# Patient Record
Sex: FEMALE | Race: ASIAN | NOT HISPANIC OR LATINO
[De-identification: names, ages, dates, MRNs, and addresses within clinical notes are randomized per-mention and may not be internally consistent; named-entity substitution may affect disease eponyms.]

---

## 2017-01-23 ENCOUNTER — APPOINTMENT (OUTPATIENT)
Dept: PULMONOLOGY | Facility: CLINIC | Age: 77
End: 2017-01-23

## 2017-02-17 ENCOUNTER — APPOINTMENT (OUTPATIENT)
Dept: OTOLARYNGOLOGY | Facility: CLINIC | Age: 77
End: 2017-02-17

## 2017-02-20 ENCOUNTER — APPOINTMENT (OUTPATIENT)
Dept: PULMONOLOGY | Facility: CLINIC | Age: 77
End: 2017-02-20

## 2017-02-20 VITALS
OXYGEN SATURATION: 97 % | HEART RATE: 101 BPM | SYSTOLIC BLOOD PRESSURE: 154 MMHG | WEIGHT: 162 LBS | BODY MASS INDEX: 28.7 KG/M2 | DIASTOLIC BLOOD PRESSURE: 84 MMHG | HEIGHT: 63 IN

## 2017-03-23 ENCOUNTER — RX RENEWAL (OUTPATIENT)
Age: 77
End: 2017-03-23

## 2017-08-23 ENCOUNTER — APPOINTMENT (OUTPATIENT)
Dept: PULMONOLOGY | Facility: CLINIC | Age: 77
End: 2017-08-23
Payer: MEDICARE

## 2017-08-23 VITALS
HEIGHT: 63 IN | OXYGEN SATURATION: 96 % | TEMPERATURE: 97.3 F | RESPIRATION RATE: 16 BRPM | HEART RATE: 73 BPM | SYSTOLIC BLOOD PRESSURE: 130 MMHG | DIASTOLIC BLOOD PRESSURE: 70 MMHG | WEIGHT: 167 LBS | BODY MASS INDEX: 29.59 KG/M2

## 2017-08-23 DIAGNOSIS — R06.09 OTHER FORMS OF DYSPNEA: ICD-10-CM

## 2017-08-23 PROCEDURE — 99214 OFFICE O/P EST MOD 30 MIN: CPT

## 2017-09-04 ENCOUNTER — EMERGENCY (EMERGENCY)
Facility: HOSPITAL | Age: 77
LOS: 1 days | Discharge: HOME | End: 2017-09-04
Attending: EMERGENCY MEDICINE | Admitting: EMERGENCY MEDICINE
Payer: MEDICARE

## 2017-09-04 VITALS
DIASTOLIC BLOOD PRESSURE: 85 MMHG | HEART RATE: 85 BPM | SYSTOLIC BLOOD PRESSURE: 165 MMHG | OXYGEN SATURATION: 100 % | RESPIRATION RATE: 16 BRPM

## 2017-09-04 PROCEDURE — 99284 EMERGENCY DEPT VISIT MOD MDM: CPT | Mod: GC

## 2017-09-04 RX ORDER — VALACYCLOVIR 500 MG/1
1 TABLET, FILM COATED ORAL
Qty: 14 | Refills: 0 | OUTPATIENT
Start: 2017-09-04 | End: 2017-09-11

## 2017-09-04 NOTE — ED PROVIDER NOTE - MEDICAL DECISION MAKING DETAILS
78 yo F w/PMH HTN, HLD presenting for left sided facial droop w/o forehead sparing. Most likely Bell's Palsy, low suspicion for CVA. Steroid sent to pharmacy. Referral to f/u outpatient neurology.

## 2017-09-04 NOTE — ED PROVIDER NOTE - MUSCULOSKELETAL, MLM
Spine appears normal, range of motion is not limited, no muscle or joint tenderness. Equal strength upper and lower extremities

## 2017-09-04 NOTE — ED PROVIDER NOTE - PROGRESS NOTE DETAILS
pt immediately evaluated for possible stroke. pt with bell's palsy with +forehead sparing. pt stable for d/c. d/c with prednisone and valtrex and neuro f/u.

## 2017-09-04 NOTE — ED PROVIDER NOTE - OBJECTIVE STATEMENT
78 yo F presenting for left sided facial droop. Pt states left sided facial droop started two days ago at rest without preceding event. Denies any other complaints.

## 2017-09-04 NOTE — ED ADULT TRIAGE NOTE - CHIEF COMPLAINT QUOTE
Pt with c/o of left facial droop having difficulty closing the left eye since Saturday. [facility] is here to evaluate the pt. No code stroke called.

## 2017-10-05 ENCOUNTER — RX RENEWAL (OUTPATIENT)
Age: 77
End: 2017-10-05

## 2017-10-06 ENCOUNTER — RX RENEWAL (OUTPATIENT)
Age: 77
End: 2017-10-06

## 2017-10-23 ENCOUNTER — RX RENEWAL (OUTPATIENT)
Age: 77
End: 2017-10-23

## 2018-02-09 ENCOUNTER — RX RENEWAL (OUTPATIENT)
Age: 78
End: 2018-02-09

## 2018-02-19 ENCOUNTER — APPOINTMENT (OUTPATIENT)
Dept: PULMONOLOGY | Facility: CLINIC | Age: 78
End: 2018-02-19
Payer: MEDICARE

## 2018-02-19 VITALS
HEART RATE: 80 BPM | OXYGEN SATURATION: 96 % | WEIGHT: 164 LBS | TEMPERATURE: 98.4 F | RESPIRATION RATE: 16 BRPM | BODY MASS INDEX: 29.05 KG/M2 | SYSTOLIC BLOOD PRESSURE: 124 MMHG | DIASTOLIC BLOOD PRESSURE: 82 MMHG

## 2018-02-19 PROCEDURE — 94727 GAS DIL/WSHOT DETER LNG VOL: CPT

## 2018-02-19 PROCEDURE — 94729 DIFFUSING CAPACITY: CPT

## 2018-02-19 PROCEDURE — 99215 OFFICE O/P EST HI 40 MIN: CPT | Mod: 25

## 2018-02-19 PROCEDURE — 94060 EVALUATION OF WHEEZING: CPT

## 2018-02-19 RX ORDER — PROMETHAZINE HYDROCHLORIDE AND CODEINE PHOSPHATE 6.25; 1 MG/5ML; MG/5ML
6.25-1 SOLUTION ORAL
Qty: 120 | Refills: 0 | Status: COMPLETED | COMMUNITY
Start: 2017-12-18

## 2018-02-19 RX ORDER — AMOXICILLIN AND CLAVULANATE POTASSIUM 875; 125 MG/1; MG/1
875-125 TABLET, COATED ORAL
Qty: 20 | Refills: 0 | Status: COMPLETED | COMMUNITY
Start: 2017-12-18

## 2018-02-19 RX ORDER — PREDNISONE 50 MG/1
50 TABLET ORAL
Qty: 10 | Refills: 0 | Status: COMPLETED | COMMUNITY
Start: 2017-09-04

## 2018-02-19 RX ORDER — ALBUTEROL SULFATE 0.63 MG/3ML
0.63 SOLUTION RESPIRATORY (INHALATION)
Qty: 225 | Refills: 0 | Status: ACTIVE | COMMUNITY
Start: 2017-11-07

## 2018-02-19 RX ORDER — AZITHROMYCIN 250 MG/1
250 TABLET, FILM COATED ORAL
Qty: 6 | Refills: 0 | Status: COMPLETED | COMMUNITY
Start: 2017-12-13

## 2018-02-19 RX ORDER — VALACYCLOVIR 500 MG/1
500 TABLET, FILM COATED ORAL
Qty: 14 | Refills: 0 | Status: COMPLETED | COMMUNITY
Start: 2017-09-04

## 2018-02-19 RX ORDER — VALACYCLOVIR 1 G/1
1 TABLET, FILM COATED ORAL
Qty: 9 | Refills: 0 | Status: COMPLETED | COMMUNITY
Start: 2017-09-07

## 2018-02-19 RX ORDER — OMEPRAZOLE 20 MG/1
20 CAPSULE, DELAYED RELEASE ORAL
Qty: 30 | Refills: 0 | Status: COMPLETED | COMMUNITY
Start: 2017-12-18

## 2018-02-19 RX ORDER — GABAPENTIN 300 MG/1
300 CAPSULE ORAL
Qty: 60 | Refills: 0 | Status: COMPLETED | COMMUNITY
Start: 2017-08-28

## 2018-02-19 RX ORDER — PREDNISONE 10 MG/1
10 TABLET ORAL
Qty: 30 | Refills: 0 | Status: COMPLETED | COMMUNITY
Start: 2017-12-18

## 2018-03-14 ENCOUNTER — OUTPATIENT (OUTPATIENT)
Dept: OUTPATIENT SERVICES | Facility: HOSPITAL | Age: 78
LOS: 1 days | End: 2018-03-14
Payer: MEDICARE

## 2018-03-14 ENCOUNTER — RESULT REVIEW (OUTPATIENT)
Age: 78
End: 2018-03-14

## 2018-03-14 DIAGNOSIS — D12.2 BENIGN NEOPLASM OF ASCENDING COLON: ICD-10-CM

## 2018-03-15 LAB — SURGICAL PATHOLOGY STUDY: SIGNIFICANT CHANGE UP

## 2018-03-21 VITALS
DIASTOLIC BLOOD PRESSURE: 77 MMHG | SYSTOLIC BLOOD PRESSURE: 152 MMHG | HEIGHT: 63 IN | RESPIRATION RATE: 16 BRPM | OXYGEN SATURATION: 99 % | HEART RATE: 61 BPM | WEIGHT: 160.06 LBS | TEMPERATURE: 98 F

## 2018-03-21 RX ORDER — OMEPRAZOLE 10 MG/1
1 CAPSULE, DELAYED RELEASE ORAL
Qty: 0 | Refills: 0 | COMMUNITY

## 2018-03-21 NOTE — PATIENT PROFILE ADULT. - PMH
Colon polyp    COPD with asthma    GERD (gastroesophageal reflux disease)    Hyperlipidemia    Hypertension    Spinal stenosis

## 2018-03-22 ENCOUNTER — RESULT REVIEW (OUTPATIENT)
Age: 78
End: 2018-03-22

## 2018-03-22 ENCOUNTER — INPATIENT (INPATIENT)
Facility: HOSPITAL | Age: 78
LOS: 3 days | Discharge: HOME | DRG: 331 | End: 2018-03-26
Attending: SURGERY | Admitting: SURGERY
Payer: MEDICARE

## 2018-03-22 DIAGNOSIS — K63.89 OTHER SPECIFIED DISEASES OF INTESTINE: ICD-10-CM

## 2018-03-22 DIAGNOSIS — I10 ESSENTIAL (PRIMARY) HYPERTENSION: ICD-10-CM

## 2018-03-22 DIAGNOSIS — Z96.643 PRESENCE OF ARTIFICIAL HIP JOINT, BILATERAL: ICD-10-CM

## 2018-03-22 DIAGNOSIS — K21.9 GASTRO-ESOPHAGEAL REFLUX DISEASE WITHOUT ESOPHAGITIS: ICD-10-CM

## 2018-03-22 DIAGNOSIS — E11.65 TYPE 2 DIABETES MELLITUS WITH HYPERGLYCEMIA: ICD-10-CM

## 2018-03-22 DIAGNOSIS — M48.00 SPINAL STENOSIS, SITE UNSPECIFIED: ICD-10-CM

## 2018-03-22 DIAGNOSIS — Z98.890 OTHER SPECIFIED POSTPROCEDURAL STATES: Chronic | ICD-10-CM

## 2018-03-22 DIAGNOSIS — E78.5 HYPERLIPIDEMIA, UNSPECIFIED: ICD-10-CM

## 2018-03-22 DIAGNOSIS — Z79.51 LONG TERM (CURRENT) USE OF INHALED STEROIDS: ICD-10-CM

## 2018-03-22 DIAGNOSIS — Z91.018 ALLERGY TO OTHER FOODS: ICD-10-CM

## 2018-03-22 DIAGNOSIS — C18.7 MALIGNANT NEOPLASM OF SIGMOID COLON: ICD-10-CM

## 2018-03-22 DIAGNOSIS — J44.9 CHRONIC OBSTRUCTIVE PULMONARY DISEASE, UNSPECIFIED: ICD-10-CM

## 2018-03-22 LAB
ANION GAP SERPL CALC-SCNC: 11 MMOL/L — SIGNIFICANT CHANGE UP (ref 5–17)
BASE EXCESS BLDA CALC-SCNC: 1 MMOL/L — SIGNIFICANT CHANGE UP (ref -2–3)
BASOPHILS NFR BLD AUTO: 0.1 % — SIGNIFICANT CHANGE UP (ref 0–2)
BUN SERPL-MCNC: 14 MG/DL — SIGNIFICANT CHANGE UP (ref 7–23)
CA-I BLDA-SCNC: 1.1 MMOL/L — LOW (ref 1.12–1.3)
CALCIUM SERPL-MCNC: 8.3 MG/DL — LOW (ref 8.4–10.5)
CHLORIDE SERPL-SCNC: 104 MMOL/L — SIGNIFICANT CHANGE UP (ref 96–108)
CO2 SERPL-SCNC: 26 MMOL/L — SIGNIFICANT CHANGE UP (ref 22–31)
COHGB MFR BLDA: 0.7 % — SIGNIFICANT CHANGE UP
CREAT SERPL-MCNC: 0.62 MG/DL — SIGNIFICANT CHANGE UP (ref 0.5–1.3)
EOSINOPHIL NFR BLD AUTO: 0 % — SIGNIFICANT CHANGE UP (ref 0–6)
GAS PNL BLDA: SIGNIFICANT CHANGE UP
GLUCOSE BLDC GLUCOMTR-MCNC: 103 MG/DL — HIGH (ref 70–99)
GLUCOSE SERPL-MCNC: 250 MG/DL — HIGH (ref 70–99)
HCO3 BLDA-SCNC: 25 MMOL/L — SIGNIFICANT CHANGE UP (ref 21–28)
HCT VFR BLD CALC: 38.2 % — SIGNIFICANT CHANGE UP (ref 34.5–45)
HGB BLD-MCNC: 12.3 G/DL — SIGNIFICANT CHANGE UP (ref 11.5–15.5)
HGB BLDA-MCNC: 12.6 G/DL — SIGNIFICANT CHANGE UP (ref 11.5–15.5)
LYMPHOCYTES # BLD AUTO: 11.8 % — LOW (ref 13–44)
MAGNESIUM SERPL-MCNC: 1.8 MG/DL — SIGNIFICANT CHANGE UP (ref 1.6–2.6)
MCHC RBC-ENTMCNC: 29.9 PG — SIGNIFICANT CHANGE UP (ref 27–34)
MCHC RBC-ENTMCNC: 32.2 G/DL — SIGNIFICANT CHANGE UP (ref 32–36)
MCV RBC AUTO: 92.9 FL — SIGNIFICANT CHANGE UP (ref 80–100)
METHGB MFR BLDA: 0.3 % — SIGNIFICANT CHANGE UP
MONOCYTES NFR BLD AUTO: 1.6 % — LOW (ref 2–14)
NEUTROPHILS NFR BLD AUTO: 86.5 % — HIGH (ref 43–77)
O2 CT VFR BLDA CALC: SIGNIFICANT CHANGE UP (ref 15–23)
OXYHGB MFR BLDA: 98 % — SIGNIFICANT CHANGE UP (ref 94–100)
PCO2 BLDA: 37 MMHG — SIGNIFICANT CHANGE UP (ref 32–45)
PH BLDA: 7.44 — SIGNIFICANT CHANGE UP (ref 7.35–7.45)
PHOSPHATE SERPL-MCNC: 3.6 MG/DL — SIGNIFICANT CHANGE UP (ref 2.5–4.5)
PLATELET # BLD AUTO: 218 K/UL — SIGNIFICANT CHANGE UP (ref 150–400)
PO2 BLDA: 246 MMHG — HIGH (ref 83–108)
POTASSIUM BLDA-SCNC: 3.4 MMOL/L — LOW (ref 3.5–4.9)
POTASSIUM SERPL-MCNC: 3.7 MMOL/L — SIGNIFICANT CHANGE UP (ref 3.5–5.3)
POTASSIUM SERPL-SCNC: 3.7 MMOL/L — SIGNIFICANT CHANGE UP (ref 3.5–5.3)
RBC # BLD: 4.11 M/UL — SIGNIFICANT CHANGE UP (ref 3.8–5.2)
RBC # FLD: 13.2 % — SIGNIFICANT CHANGE UP (ref 10.3–16.9)
SAO2 % BLDA: 99 % — SIGNIFICANT CHANGE UP (ref 95–100)
SODIUM BLDA-SCNC: 141 MMOL/L — SIGNIFICANT CHANGE UP (ref 138–146)
SODIUM SERPL-SCNC: 141 MMOL/L — SIGNIFICANT CHANGE UP (ref 135–145)
WBC # BLD: 9.8 K/UL — SIGNIFICANT CHANGE UP (ref 3.8–10.5)
WBC # FLD AUTO: 9.8 K/UL — SIGNIFICANT CHANGE UP (ref 3.8–10.5)

## 2018-03-22 PROCEDURE — 88321 CONSLTJ&REPRT SLD PREP ELSWR: CPT

## 2018-03-22 RX ORDER — METOCLOPRAMIDE HCL 10 MG
10 TABLET ORAL ONCE
Qty: 0 | Refills: 0 | Status: DISCONTINUED | OUTPATIENT
Start: 2018-03-22 | End: 2018-03-26

## 2018-03-22 RX ORDER — CHOLECALCIFEROL (VITAMIN D3) 125 MCG
1 CAPSULE ORAL
Qty: 0 | Refills: 0 | COMMUNITY

## 2018-03-22 RX ORDER — TIOTROPIUM BROMIDE 18 UG/1
1 CAPSULE ORAL; RESPIRATORY (INHALATION)
Qty: 0 | Refills: 0 | COMMUNITY

## 2018-03-22 RX ORDER — BUDESONIDE AND FORMOTEROL FUMARATE DIHYDRATE 160; 4.5 UG/1; UG/1
2 AEROSOL RESPIRATORY (INHALATION)
Qty: 0 | Refills: 0 | Status: DISCONTINUED | OUTPATIENT
Start: 2018-03-22 | End: 2018-03-26

## 2018-03-22 RX ORDER — HYDROMORPHONE HYDROCHLORIDE 2 MG/ML
0.5 INJECTION INTRAMUSCULAR; INTRAVENOUS; SUBCUTANEOUS ONCE
Qty: 0 | Refills: 0 | Status: DISCONTINUED | OUTPATIENT
Start: 2018-03-22 | End: 2018-03-23

## 2018-03-22 RX ORDER — ONDANSETRON 8 MG/1
4 TABLET, FILM COATED ORAL EVERY 6 HOURS
Qty: 0 | Refills: 0 | Status: DISCONTINUED | OUTPATIENT
Start: 2018-03-22 | End: 2018-03-26

## 2018-03-22 RX ORDER — HEPARIN SODIUM 5000 [USP'U]/ML
5000 INJECTION INTRAVENOUS; SUBCUTANEOUS EVERY 8 HOURS
Qty: 0 | Refills: 0 | Status: DISCONTINUED | OUTPATIENT
Start: 2018-03-23 | End: 2018-03-26

## 2018-03-22 RX ORDER — CEFOTETAN DISODIUM 1 G
2 VIAL (EA) INJECTION EVERY 12 HOURS
Qty: 0 | Refills: 0 | Status: COMPLETED | OUTPATIENT
Start: 2018-03-22 | End: 2018-03-23

## 2018-03-22 RX ORDER — KETOROLAC TROMETHAMINE 30 MG/ML
30 SYRINGE (ML) INJECTION EVERY 6 HOURS
Qty: 0 | Refills: 0 | Status: DISCONTINUED | OUTPATIENT
Start: 2018-03-22 | End: 2018-03-25

## 2018-03-22 RX ORDER — OMEPRAZOLE 10 MG/1
1 CAPSULE, DELAYED RELEASE ORAL
Qty: 0 | Refills: 0 | COMMUNITY

## 2018-03-22 RX ORDER — BUDESONIDE AND FORMOTEROL FUMARATE DIHYDRATE 160; 4.5 UG/1; UG/1
2 AEROSOL RESPIRATORY (INHALATION)
Qty: 0 | Refills: 0 | COMMUNITY

## 2018-03-22 RX ORDER — ACETAMINOPHEN 500 MG
1000 TABLET ORAL ONCE
Qty: 0 | Refills: 0 | Status: COMPLETED | OUTPATIENT
Start: 2018-03-22 | End: 2018-03-23

## 2018-03-22 RX ORDER — SODIUM CHLORIDE 9 MG/ML
1000 INJECTION, SOLUTION INTRAVENOUS
Qty: 0 | Refills: 0 | Status: DISCONTINUED | OUTPATIENT
Start: 2018-03-22 | End: 2018-03-23

## 2018-03-22 RX ORDER — ATENOLOL 25 MG/1
25 TABLET ORAL DAILY
Qty: 0 | Refills: 0 | Status: DISCONTINUED | OUTPATIENT
Start: 2018-03-22 | End: 2018-03-26

## 2018-03-22 RX ORDER — PANTOPRAZOLE SODIUM 20 MG/1
40 TABLET, DELAYED RELEASE ORAL
Qty: 0 | Refills: 0 | Status: DISCONTINUED | OUTPATIENT
Start: 2018-03-22 | End: 2018-03-26

## 2018-03-22 RX ORDER — SIMVASTATIN 20 MG/1
20 TABLET, FILM COATED ORAL AT BEDTIME
Qty: 0 | Refills: 0 | Status: DISCONTINUED | OUTPATIENT
Start: 2018-03-22 | End: 2018-03-26

## 2018-03-22 RX ORDER — ATENOLOL 25 MG/1
1 TABLET ORAL
Qty: 0 | Refills: 0 | COMMUNITY

## 2018-03-22 RX ORDER — SIMVASTATIN 20 MG/1
1 TABLET, FILM COATED ORAL
Qty: 0 | Refills: 0 | COMMUNITY

## 2018-03-22 RX ORDER — TIOTROPIUM BROMIDE 18 UG/1
1 CAPSULE ORAL; RESPIRATORY (INHALATION) DAILY
Qty: 0 | Refills: 0 | Status: DISCONTINUED | OUTPATIENT
Start: 2018-03-22 | End: 2018-03-26

## 2018-03-22 RX ADMIN — Medication 30 MILLIGRAM(S): at 21:27

## 2018-03-22 RX ADMIN — Medication 30 MILLIGRAM(S): at 21:58

## 2018-03-22 NOTE — BRIEF OPERATIVE NOTE - OPERATION/FINDINGS
Preoperative Diagnosis: colon mass  Postoperative Diagnosis: same  Procedure: laparoscopic sigmoidectomy, BSO  Findings: colon mass not invading uterus.  BSO performed given concern for potential recurrence in the ovaries, but they were not grossly involved. Preoperative Diagnosis: colon mass  Postoperative Diagnosis: same  Procedure: laparoscopic sigmoidectomy, BSO  Findings: colon mass not invading uterus.  BSO performed given concern for potential recurrence in the ovaries, but they were not grossly involved.  ICG test showed good flow.  Negative air leak test.

## 2018-03-22 NOTE — H&P ADULT - HISTORY OF PRESENT ILLNESS
78F found to have rectal polyp that was suspicious on imaging for a malignancy, now here for sigmoidectomy.    PHYSICAL EXAM:  Constitutional: no acute distress, NC/AT  Respiratory: CTA/B  Cardiovascular: RRR, no M/R/G  Gastrointestinal: soft, NT/ND  Genitourinary: no CVA TTP    A/P: 78F s/p laparoscopic sigmoidectomy, BSO, cystoscopy and ureteral stent placement, s/p removal of both stents at end of case.    1. s/p surgery  - clears / IVF  - pain control: hPCA  - OOBA / DVT PPx / IS    2. DM  - ISS    3. HTN  - restart home atenolol    4. COPD w/ asthma  - restart home budenoside-formeterol  - restart home tiotropium    5. HLD  - restart home simvastatin    6. dispo  - likely home w/o home care services

## 2018-03-22 NOTE — PROGRESS NOTE ADULT - SUBJECTIVE AND OBJECTIVE BOX
Procedure: Laparoscopic sigmoidectomy  Surgeon:     Subjective: Patient examined postoperatively. Pain adequately controlled. Denies f/c/n/v/CP/SOB.       Vital Signs Last 24 Hrs  T(C): 36.7 (22 Mar 2018 21:34), Max: 36.7 (22 Mar 2018 20:19)  T(F): 98 (22 Mar 2018 21:34), Max: 98 (22 Mar 2018 20:19)  HR: 74 (22 Mar 2018 21:34) (66 - 74)  BP: 149/72 (22 Mar 2018 21:34) (142/76 - 162/75)  BP(mean): 107 (22 Mar 2018 21:34) (94 - 112)  RR: 19 (22 Mar 2018 21:34) (18 - 29)  SpO2: 100% (22 Mar 2018 21:34) (100% - 100%)    Physical Exam:  General: NAD, resting comfortably in bed  Pulmonary: Nonlabored breathing, no respiratory distress  Abdominal: soft, appropriately tender, mildly distended, incisions CDI      LABS:                        12.3   9.8   )-----------( 218      ( 22 Mar 2018 20:45 )             38.2     03-22    141  |  104  |  14  ----------------------------<  250<H>  3.7   |  26  |  0.62    Ca    8.3<L>      22 Mar 2018 20:45  Phos  3.6     03-22  Mg     1.8     03-22        CAPILLARY BLOOD GLUCOSE      POCT Blood Glucose.: 103 mg/dL (22 Mar 2018 13:24)        ABO Interpretation: O (03-22 @ 16:20)

## 2018-03-23 RX ORDER — INSULIN LISPRO 100/ML
VIAL (ML) SUBCUTANEOUS AT BEDTIME
Qty: 0 | Refills: 0 | Status: DISCONTINUED | OUTPATIENT
Start: 2018-03-23 | End: 2018-03-24

## 2018-03-23 RX ORDER — MAGNESIUM SULFATE 500 MG/ML
1 VIAL (ML) INJECTION ONCE
Qty: 0 | Refills: 0 | Status: COMPLETED | OUTPATIENT
Start: 2018-03-23 | End: 2018-03-23

## 2018-03-23 RX ORDER — SODIUM CHLORIDE 9 MG/ML
1000 INJECTION, SOLUTION INTRAVENOUS
Qty: 0 | Refills: 0 | Status: DISCONTINUED | OUTPATIENT
Start: 2018-03-23 | End: 2018-03-26

## 2018-03-23 RX ORDER — POTASSIUM CHLORIDE 20 MEQ
10 PACKET (EA) ORAL
Qty: 0 | Refills: 0 | Status: COMPLETED | OUTPATIENT
Start: 2018-03-23 | End: 2018-03-23

## 2018-03-23 RX ORDER — INSULIN LISPRO 100/ML
VIAL (ML) SUBCUTANEOUS
Qty: 0 | Refills: 0 | Status: DISCONTINUED | OUTPATIENT
Start: 2018-03-23 | End: 2018-03-26

## 2018-03-23 RX ADMIN — Medication 1000 MILLIGRAM(S): at 22:46

## 2018-03-23 RX ADMIN — HEPARIN SODIUM 5000 UNIT(S): 5000 INJECTION INTRAVENOUS; SUBCUTANEOUS at 14:21

## 2018-03-23 RX ADMIN — Medication 100 GRAM(S): at 14:21

## 2018-03-23 RX ADMIN — Medication 1 DROP(S): at 07:02

## 2018-03-23 RX ADMIN — Medication 1 DROP(S): at 00:02

## 2018-03-23 RX ADMIN — SIMVASTATIN 20 MILLIGRAM(S): 20 TABLET, FILM COATED ORAL at 22:45

## 2018-03-23 RX ADMIN — SODIUM CHLORIDE 110 MILLILITER(S): 9 INJECTION, SOLUTION INTRAVENOUS at 18:43

## 2018-03-23 RX ADMIN — HEPARIN SODIUM 5000 UNIT(S): 5000 INJECTION INTRAVENOUS; SUBCUTANEOUS at 22:46

## 2018-03-23 RX ADMIN — HEPARIN SODIUM 5000 UNIT(S): 5000 INJECTION INTRAVENOUS; SUBCUTANEOUS at 07:02

## 2018-03-23 RX ADMIN — Medication 30 MILLIGRAM(S): at 16:00

## 2018-03-23 RX ADMIN — Medication 100 GRAM(S): at 07:02

## 2018-03-23 RX ADMIN — PANTOPRAZOLE SODIUM 40 MILLIGRAM(S): 20 TABLET, DELAYED RELEASE ORAL at 07:02

## 2018-03-23 RX ADMIN — ATENOLOL 25 MILLIGRAM(S): 25 TABLET ORAL at 07:02

## 2018-03-23 RX ADMIN — Medication 100 MILLIEQUIVALENT(S): at 11:19

## 2018-03-23 RX ADMIN — Medication 400 MILLIGRAM(S): at 22:45

## 2018-03-23 RX ADMIN — HYDROMORPHONE HYDROCHLORIDE 0.5 MILLIGRAM(S): 2 INJECTION INTRAMUSCULAR; INTRAVENOUS; SUBCUTANEOUS at 00:32

## 2018-03-23 RX ADMIN — Medication 100 GRAM(S): at 18:54

## 2018-03-23 RX ADMIN — Medication 30 MILLIGRAM(S): at 14:56

## 2018-03-23 NOTE — PROGRESS NOTE ADULT - SUBJECTIVE AND OBJECTIVE BOX
o/n: s/p lap sigmoidectomy, POC WNL o/n: s/p lap anterior resection, POC WNL    Vital Signs Last 24 Hrs  T(C): 36.3 (23 Mar 2018 09:16), Max: 36.7 (22 Mar 2018 20:19)  T(F): 97.4 (23 Mar 2018 09:16), Max: 98 (22 Mar 2018 20:19)  HR: 80 (23 Mar 2018 09:16) (66 - 80)  BP: 117/70 (23 Mar 2018 09:16) (117/70 - 162/75)  BP(mean): 88 (23 Mar 2018 00:04) (88 - 112)  RR: 14 (23 Mar 2018 09:16) (14 - 29)  SpO2: 98% (23 Mar 2018 09:16) (96% - 100%)      I&O's Summary    22 Mar 2018 07:01  -  23 Mar 2018 07:00  --------------------------------------------------------  IN: 1650 mL / OUT: 1050 mL / NET: 600 mL    23 Mar 2018 07:01  -  23 Mar 2018 10:31  --------------------------------------------------------  IN: 450 mL / OUT: 150 mL / NET: 300 mL          Gen: NAD   Abd: soft, nt /nd   incisions c/d/i       A/P: 78F s/p laparoscopic sigmoidectomy, BSO, cystoscopy and ureteral stent placement, s/p removal of both stents at end of case.  pain control  dvt ppx   NPO   IVF   calero removed --> DTV   OOB   monitor GI fxn

## 2018-03-23 NOTE — PROGRESS NOTE ADULT - ASSESSMENT
A/P: 78F s/p laparoscopic sigmoidectomy, BSO, cystoscopy and ureteral stent placement, s/p removal of both stents at end of case.    1. s/p surgery  - clears / IVF  - pain control: hPCA  - OOBA / DVT PPx / IS    2. DM  - ISS    3. HTNA/P: 78F s/p laparoscopic sigmoidectomy, BSO, cystoscopy and ureteral stent placement, s/p removal of both stents at end of case.    - clears / IVF  - pain control: hPCA  - OOBA / DVT PPx / IS  - ISS  - restart home atenolol  - restart home budenoside-formeterol  - restart home tiotropium  - restart home simvastatin  - likely home w/o home care services

## 2018-03-23 NOTE — PROGRESS NOTE ADULT - SUBJECTIVE AND OBJECTIVE BOX
78F found to have rectal polyp that was suspicious on imaging for a malignancy, now here for sigmoidectomy.    Colon polyp    COPD with asthma    GERD (gastroesophageal reflux disease)    Hyperlipidemia    Hypertension    Spinal stenosis.    Past Surgical History:  History of bilateral hip replacements    History of laminectomy  2013.    	  MEDICATIONS:  ATENolol  Tablet 25 milliGRAM(s) Oral daily    cefoTEtan  IVPB 2 Gram(s) IV Intermittent every 12 hours    buDESOnide 160 MICROgram(s)/formoterol 4.5 MICROgram(s) Inhaler 2 Puff(s) Inhalation two times a day  tiotropium 18 MICROgram(s) Capsule 1 Capsule(s) Inhalation daily    acetaminophen  IVPB. 1000 milliGRAM(s) IV Intermittent once  ketorolac   Injectable 30 milliGRAM(s) IV Push every 6 hours PRN  metoclopramide Injectable 10 milliGRAM(s) IV Push once PRN  ondansetron Injectable 4 milliGRAM(s) IV Push every 6 hours PRN    pantoprazole    Tablet 40 milliGRAM(s) Oral before breakfast    simvastatin 20 milliGRAM(s) Oral at bedtime    artificial  tears Solution 1 Drop(s) Both EYES three times a day PRN  heparin  Injectable 5000 Unit(s) SubCutaneous every 8 hours  lactated ringers. 1000 milliLiter(s) IV Continuous <Continuous>      Complaint:     Otherwise 12 point review of systems is normal.    PHYSICAL EXAM:    Constitutional: NAD  Eyes: PERRL, EOMI, sclera non-icteric  Neck: supple, no masses, no JVD  Respiratory: CTA b/l, good air entry b/l, no wheezing, rhonchi, rales  Cardiovascular: RRR, normal S1S2, no M/R/G  Gastrointestinal: soft, NTND,   Extremities:  no c/c/e  Neurological: AAOx3    ICU Vital Signs Last 24 Hrs  T(C): 36.3 (23 Mar 2018 16:43), Max: 36.7 (22 Mar 2018 20:19)  T(F): 97.4 (23 Mar 2018 16:43), Max: 98 (22 Mar 2018 20:19)  HR: 77 (23 Mar 2018 16:43) (66 - 80)  BP: 135/70 (23 Mar 2018 16:43) (117/70 - 162/75)  BP(mean): 88 (23 Mar 2018 00:04) (88 - 112)  ABP: 144/56 (23 Mar 2018 00:04) (104/84 - 178/66)  ABP(mean): 90 (23 Mar 2018 00:04) (90 - 112)  RR: 17 (23 Mar 2018 16:43) (14 - 29)  SpO2: 97% (23 Mar 2018 16:43) (94% - 100%)        ECG:      CHEST X RAY    CT    MRI    MRA    CT ANGIO    CAROTID DUPLEX    DUPLEX     Echocardiogram    Catheterization:    Stress Test:     LABS:	 	  CARDIAC MARKERS:                              12.3   9.8   )-----------( 218      ( 22 Mar 2018 20:45 )             38.2     03-22    141  |  104  |  14  ----------------------------<  250<H>  3.7   |  26  |  0.62    Ca    8.3<L>      22 Mar 2018 20:45  Phos  3.6     03-22  Mg     1.8     03-22          ASSESSMENT/PLAN: 	    S/P sigmoidectomy     called

## 2018-03-23 NOTE — CONSULT NOTE ADULT - SUBJECTIVE AND OBJECTIVE BOX
HPI:  78F found to have rectal polyp that was suspicious on imaging for a malignancy, now here for sigmoidectomy.    PHYSICAL EXAM:  Constitutional: no acute distress, NC/AT  Respiratory: CTA/B  Cardiovascular: RRR, no M/R/G  Gastrointestinal: soft, NT/ND  Genitourinary: no CVA TTP    A/P: 78F s/p laparoscopic sigmoidectomy, BSO, cystoscopy and ureteral stent placement, s/p removal of both stents at end of case.    1. s/p surgery  - clears / IVF  - pain control: hPCA  - OOBA / DVT PPx / IS    2. DM  - ISS    3. HTN  - restart home atenolol    4. COPD w/ asthma  - restart home budenoside-formeterol  - restart home tiotropium    5. HLD  - restart home simvastatin    6. dispo  - likely home w/o home care services (22 Mar 2018 20:04)      PAST MEDICAL & SURGICAL HISTORY:  GERD (gastroesophageal reflux disease)  COPD with asthma  Colon polyp  Spinal stenosis  Hyperlipidemia  Hypertension  History of bilateral hip replacements  History of laminectomy: 2013      FAMILY HISTORY:      SOCIAL HISTORY:  Smoking:  ETOH:  Drug use:    HOME MEDICATIONS:    Insulin Pump Settings      MEDICATIONS  (STANDING):  ATENolol  Tablet 25 milliGRAM(s) Oral daily  buDESOnide 160 MICROgram(s)/formoterol 4.5 MICROgram(s) Inhaler 2 Puff(s) Inhalation two times a day  heparin  Injectable 5000 Unit(s) SubCutaneous every 8 hours  lactated ringers. 1000 milliLiter(s) (110 mL/Hr) IV Continuous <Continuous>  pantoprazole    Tablet 40 milliGRAM(s) Oral before breakfast  simvastatin 20 milliGRAM(s) Oral at bedtime  tiotropium 18 MICROgram(s) Capsule 1 Capsule(s) Inhalation daily    MEDICATIONS  (PRN):  artificial  tears Solution 1 Drop(s) Both EYES three times a day PRN Dry Eyes  ketorolac   Injectable 30 milliGRAM(s) IV Push every 6 hours PRN Moderate Pain  metoclopramide Injectable 10 milliGRAM(s) IV Push once PRN Nausea and/or Vomiting  ondansetron Injectable 4 milliGRAM(s) IV Push every 6 hours PRN Nausea      Allergies    Carrots (Pruritus)  dust (Pruritus)  No Known Drug Allergies  Nuts (Pruritus)  shellfish (Pruritus)    Intolerances        REVIEW OF SYSTEMS  CONSTITUTIONAL:  Negative fever or chills  EYES:  Negative visual field deficit, blurry vision or double vision  ENT:  Negative for sore throat, runny nose, or earache  CARDIOVASCULAR:  Negative for chest pain or palpitations  RESPIRATORY:  Negative for cough, wheezing, sputum production, or SOB   GASTROINTESTINAL:  Negative for nausea, vomiting, diarrhea, or abdominal pain  GENITOURINARY:  Negative frequency, urgency or dysuria  MUSCULOSKELETAL:  No joint pain or stiffness  INTEGUMENTARY: no rashes  NEUROLOGIC:  No headache, confusion, syncope or seizures  PSYCHIATRIC: No depression or anxiety  HEMATOLOGY AND ONCOLOGY:  No unusual infections or bleeding    CAPILLARY GLUCOSE:  CAPILLARY BLOOD GLUCOSE        LABS:                        12.3   9.8   )-----------( 218      ( 22 Mar 2018 20:45 )             38.2     03-22    141  |  104  |  14  ----------------------------<  250<H>  3.7   |  26  |  0.62    Ca    8.3<L>      22 Mar 2018 20:45  Phos  3.6     03-22  Mg     1.8     03-22            RADIOLOGY & ADDITIONAL STUDIES:      Physical Examination:  Vital Signs Last 24 Hrs  T(C): 37.1 (23 Mar 2018 22:52), Max: 37.1 (23 Mar 2018 22:52)  T(F): 98.7 (23 Mar 2018 22:52), Max: 98.7 (23 Mar 2018 22:52)  HR: 81 (23 Mar 2018 22:52) (71 - 81)  BP: 134/70 (23 Mar 2018 22:52) (117/70 - 155/73)  BP(mean): 88 (23 Mar 2018 00:04) (88 - 88)  RR: 14 (23 Mar 2018 22:52) (14 - 17)  SpO2: 95% (23 Mar 2018 22:52) (94% - 99%)    Constitutional: wn/wd in NAD.   HEENT: NCAT, MMM, OP clear, EOMI, , no proptosis or lid retraction  Neck: no thyromegaly or palpable thyroid nodules   Respiratory: lungs CTAB.  Cardiovascular: regular rhythm, normal S1 and S2, no audible murmurs, no peripheral edema  GI: soft, NT/ND, no masses/HSM appreciated.  Neurology: no tremors.   Skin: no visible rashes/lesions  Psychiatric: AAO x 3, normal affect/mood.      ASSESSMENT/RECOMMENDATIONS:  Patient is a 77y old  Female who presents with a chief complaint of sigmoid colon resection (21 Mar 2018 11:25)  Consulted for management of hyperglycemia.    Recommend:  Low carbohydrate diet  Fingersticks glucose (FSG) qid AC and HS  Lispro rapid acting insulin moderate dose sliding scale coverage  Blood sugar target 100-150 mg/dl      Plan discussed with primary team.  Management  of other medical issues per primary team.    Attending attestation:  I have seen and evaluated the patient at the bedside.   Nurse Practitioner/Fellow/Resident’s note reviewed, including vital signs, PE and laboratory results.  Direct personal management and extensive interpretation of the data conducted.   Assessment and recommendations as above. HPI:  78F found to have rectal polyp that was suspicious on imaging for a malignancy, now here for sigmoidectomy.    PHYSICAL EXAM:  Constitutional: no acute distress, NC/AT  Respiratory: CTA/B  Cardiovascular: RRR, no M/R/G  Gastrointestinal: soft, NT/ND  Genitourinary: no CVA TTP    A/P: 78F s/p laparoscopic sigmoidectomy, BSO, cystoscopy and ureteral stent placement, s/p removal of both stents at end of case.    1. s/p surgery  - clears / IVF  - pain control: hPCA  - OOBA / DVT PPx / IS    2. DM  - ISS    3. HTN  - restart home atenolol    4. COPD w/ asthma  - restart home budenoside-formeterol  - restart home tiotropium    5. HLD  - restart home simvastatin    6. dispo  - likely home w/o home care services (22 Mar 2018 20:04)      PAST MEDICAL & SURGICAL HISTORY:  GERD (gastroesophageal reflux disease)  COPD with asthma  Colon polyp  Spinal stenosis  Hyperlipidemia  Hypertension  History of bilateral hip replacements  History of laminectomy: 2013      FAMILY HISTORY:      SOCIAL HISTORY:  Smoking:  ETOH:  Drug use:    HOME MEDICATIONS:      MEDICATIONS  (STANDING):  ATENolol  Tablet 25 milliGRAM(s) Oral daily  buDESOnide 160 MICROgram(s)/formoterol 4.5 MICROgram(s) Inhaler 2 Puff(s) Inhalation two times a day  heparin  Injectable 5000 Unit(s) SubCutaneous every 8 hours  lactated ringers. 1000 milliLiter(s) (110 mL/Hr) IV Continuous <Continuous>  pantoprazole    Tablet 40 milliGRAM(s) Oral before breakfast  simvastatin 20 milliGRAM(s) Oral at bedtime  tiotropium 18 MICROgram(s) Capsule 1 Capsule(s) Inhalation daily    MEDICATIONS  (PRN):  artificial  tears Solution 1 Drop(s) Both EYES three times a day PRN Dry Eyes  ketorolac   Injectable 30 milliGRAM(s) IV Push every 6 hours PRN Moderate Pain  metoclopramide Injectable 10 milliGRAM(s) IV Push once PRN Nausea and/or Vomiting  ondansetron Injectable 4 milliGRAM(s) IV Push every 6 hours PRN Nausea      Allergies    Carrots (Pruritus)  dust (Pruritus)  No Known Drug Allergies  Nuts (Pruritus)  shellfish (Pruritus)    Intolerances        REVIEW OF SYSTEMS  CONSTITUTIONAL:  Negative fever or chills  EYES:  Negative visual field deficit, blurry vision or double vision  ENT:  Negative for sore throat, runny nose, or earache  CARDIOVASCULAR:  Negative for chest pain or palpitations  RESPIRATORY:  Negative for cough, wheezing, sputum production, or SOB   GASTROINTESTINAL:  Negative for nausea, vomiting, diarrhea, or abdominal pain  GENITOURINARY:  Negative frequency, urgency or dysuria  MUSCULOSKELETAL:  No joint pain or stiffness  INTEGUMENTARY: no rashes  NEUROLOGIC:  No headache, confusion, syncope or seizures  PSYCHIATRIC: No depression or anxiety  HEMATOLOGY AND ONCOLOGY:  No unusual infections or bleeding    CAPILLARY GLUCOSE:  CAPILLARY BLOOD GLUCOSE        LABS:                        12.3   9.8   )-----------( 218      ( 22 Mar 2018 20:45 )             38.2     03-22    141  |  104  |  14  ----------------------------<  250<H>  3.7   |  26  |  0.62    Ca    8.3<L>      22 Mar 2018 20:45  Phos  3.6     03-22  Mg     1.8     03-22            RADIOLOGY & ADDITIONAL STUDIES:      Physical Examination:  Vital Signs Last 24 Hrs  T(C): 37.1 (23 Mar 2018 22:52), Max: 37.1 (23 Mar 2018 22:52)  T(F): 98.7 (23 Mar 2018 22:52), Max: 98.7 (23 Mar 2018 22:52)  HR: 81 (23 Mar 2018 22:52) (71 - 81)  BP: 134/70 (23 Mar 2018 22:52) (117/70 - 155/73)  BP(mean): 88 (23 Mar 2018 00:04) (88 - 88)  RR: 14 (23 Mar 2018 22:52) (14 - 17)  SpO2: 95% (23 Mar 2018 22:52) (94% - 99%)    Constitutional: wn/wd in NAD.   HEENT: NCAT, MMM, OP clear, EOMI, , no proptosis or lid retraction  Neck: no thyromegaly or palpable thyroid nodules   Respiratory: lungs CTAB.  Cardiovascular: regular rhythm, normal S1 and S2, no audible murmurs, no peripheral edema  GI: soft, NT/ND, no masses/HSM appreciated.  Neurology: no tremors.   Skin: no visible rashes/lesions  Psychiatric: AAO x 3, normal affect/mood.      ASSESSMENT/RECOMMENDATIONS:  Patient is a 77y old  Female who presents with a chief complaint of sigmoid colon resection (21 Mar 2018 11:25)  Consulted for management of hyperglycemia.    Recommend:  Low carbohydrate diet  Fingersticks glucose (FSG) qid AC and HS  Lispro rapid acting insulin moderate dose sliding scale coverage  Blood sugar target 100-150 mg/dl      Plan discussed with primary team.  Management  of other medical issues per primary team.    Attending attestation:  I have seen and evaluated the patient at the bedside.   Nurse Practitioner/Fellow/Resident’s note reviewed, including vital signs, PE and laboratory results.  Direct personal management and extensive interpretation of the data conducted.   Assessment and recommendations as above.

## 2018-03-24 LAB
ANION GAP SERPL CALC-SCNC: 9 MMOL/L — SIGNIFICANT CHANGE UP (ref 5–17)
BUN SERPL-MCNC: 13 MG/DL — SIGNIFICANT CHANGE UP (ref 7–23)
CALCIUM SERPL-MCNC: 8.1 MG/DL — LOW (ref 8.4–10.5)
CHLORIDE SERPL-SCNC: 105 MMOL/L — SIGNIFICANT CHANGE UP (ref 96–108)
CO2 SERPL-SCNC: 27 MMOL/L — SIGNIFICANT CHANGE UP (ref 22–31)
CREAT SERPL-MCNC: 0.7 MG/DL — SIGNIFICANT CHANGE UP (ref 0.5–1.3)
GLUCOSE BLDC GLUCOMTR-MCNC: 111 MG/DL — HIGH (ref 70–99)
GLUCOSE BLDC GLUCOMTR-MCNC: 120 MG/DL — HIGH (ref 70–99)
GLUCOSE BLDC GLUCOMTR-MCNC: 137 MG/DL — HIGH (ref 70–99)
GLUCOSE BLDC GLUCOMTR-MCNC: 144 MG/DL — HIGH (ref 70–99)
GLUCOSE SERPL-MCNC: 130 MG/DL — HIGH (ref 70–99)
HBA1C BLD-MCNC: 6.7 % — HIGH (ref 4–5.6)
HCT VFR BLD CALC: 34.1 % — LOW (ref 34.5–45)
HGB BLD-MCNC: 10.8 G/DL — LOW (ref 11.5–15.5)
MAGNESIUM SERPL-MCNC: 2.1 MG/DL — SIGNIFICANT CHANGE UP (ref 1.6–2.6)
MCHC RBC-ENTMCNC: 29.6 PG — SIGNIFICANT CHANGE UP (ref 27–34)
MCHC RBC-ENTMCNC: 31.7 G/DL — LOW (ref 32–36)
MCV RBC AUTO: 93.4 FL — SIGNIFICANT CHANGE UP (ref 80–100)
PHOSPHATE SERPL-MCNC: 2.3 MG/DL — LOW (ref 2.5–4.5)
PLATELET # BLD AUTO: 238 K/UL — SIGNIFICANT CHANGE UP (ref 150–400)
POTASSIUM SERPL-MCNC: 3.6 MMOL/L — SIGNIFICANT CHANGE UP (ref 3.5–5.3)
POTASSIUM SERPL-SCNC: 3.6 MMOL/L — SIGNIFICANT CHANGE UP (ref 3.5–5.3)
RBC # BLD: 3.65 M/UL — LOW (ref 3.8–5.2)
RBC # FLD: 13.7 % — SIGNIFICANT CHANGE UP (ref 10.3–16.9)
SODIUM SERPL-SCNC: 141 MMOL/L — SIGNIFICANT CHANGE UP (ref 135–145)
WBC # BLD: 8.9 K/UL — SIGNIFICANT CHANGE UP (ref 3.8–10.5)
WBC # FLD AUTO: 8.9 K/UL — SIGNIFICANT CHANGE UP (ref 3.8–10.5)

## 2018-03-24 RX ORDER — POTASSIUM PHOSPHATE, MONOBASIC POTASSIUM PHOSPHATE, DIBASIC 236; 224 MG/ML; MG/ML
30 INJECTION, SOLUTION INTRAVENOUS ONCE
Qty: 0 | Refills: 0 | Status: COMPLETED | OUTPATIENT
Start: 2018-03-24 | End: 2018-03-24

## 2018-03-24 RX ADMIN — BUDESONIDE AND FORMOTEROL FUMARATE DIHYDRATE 2 PUFF(S): 160; 4.5 AEROSOL RESPIRATORY (INHALATION) at 17:30

## 2018-03-24 RX ADMIN — POTASSIUM PHOSPHATE, MONOBASIC POTASSIUM PHOSPHATE, DIBASIC 85 MILLIMOLE(S): 236; 224 INJECTION, SOLUTION INTRAVENOUS at 12:41

## 2018-03-24 RX ADMIN — SIMVASTATIN 20 MILLIGRAM(S): 20 TABLET, FILM COATED ORAL at 22:40

## 2018-03-24 RX ADMIN — ATENOLOL 25 MILLIGRAM(S): 25 TABLET ORAL at 05:16

## 2018-03-24 RX ADMIN — Medication 30 MILLIGRAM(S): at 14:22

## 2018-03-24 RX ADMIN — HEPARIN SODIUM 5000 UNIT(S): 5000 INJECTION INTRAVENOUS; SUBCUTANEOUS at 22:40

## 2018-03-24 RX ADMIN — HEPARIN SODIUM 5000 UNIT(S): 5000 INJECTION INTRAVENOUS; SUBCUTANEOUS at 14:07

## 2018-03-24 RX ADMIN — BUDESONIDE AND FORMOTEROL FUMARATE DIHYDRATE 2 PUFF(S): 160; 4.5 AEROSOL RESPIRATORY (INHALATION) at 05:16

## 2018-03-24 RX ADMIN — PANTOPRAZOLE SODIUM 40 MILLIGRAM(S): 20 TABLET, DELAYED RELEASE ORAL at 08:08

## 2018-03-24 RX ADMIN — SODIUM CHLORIDE 110 MILLILITER(S): 9 INJECTION, SOLUTION INTRAVENOUS at 05:26

## 2018-03-24 RX ADMIN — Medication 30 MILLIGRAM(S): at 14:07

## 2018-03-24 RX ADMIN — BUDESONIDE AND FORMOTEROL FUMARATE DIHYDRATE 2 PUFF(S): 160; 4.5 AEROSOL RESPIRATORY (INHALATION) at 00:00

## 2018-03-24 RX ADMIN — HEPARIN SODIUM 5000 UNIT(S): 5000 INJECTION INTRAVENOUS; SUBCUTANEOUS at 05:14

## 2018-03-24 NOTE — PROGRESS NOTE ADULT - SUBJECTIVE AND OBJECTIVE BOX
o/n: FRANCOISE  3/23: Montanez out Passed TOV o/n: FRANCOISE  3/23: Montanez out Passed TOV        Vital Signs Last 24 Hrs  T(C): 36.1 (24 Mar 2018 05:09), Max: 37.1 (23 Mar 2018 22:52)  T(F): 97 (24 Mar 2018 05:09), Max: 98.7 (23 Mar 2018 22:52)  HR: 84 (24 Mar 2018 05:09) (71 - 84)  BP: 155/82 (24 Mar 2018 05:09) (117/70 - 155/82)  BP(mean): --  RR: 16 (24 Mar 2018 05:09) (14 - 17)  SpO2: 93% (24 Mar 2018 05:09) (93% - 98%)      I&O's Summary    22 Mar 2018 07:01  -  23 Mar 2018 07:00  --------------------------------------------------------  IN: 1650 mL / OUT: 1050 mL / NET: 600 mL    23 Mar 2018 07:01  -  24 Mar 2018 06:29  --------------------------------------------------------  IN: 4310 mL / OUT: 1390 mL / NET: 2920 mL        Gen: NAD   Abd: soft, nt / nd   incisions c.d.i       78F s/p laparoscopic sigmoidectomy, BSO, cystoscopy and ureteral stent placement, s/p removal of both stents at end of case.  pain control   dvt ppx   monitor gi fxn   oob   cld

## 2018-03-24 NOTE — PROGRESS NOTE ADULT - SUBJECTIVE AND OBJECTIVE BOX
INTERVAL HPI/OVERNIGHT EVENTS:      Patient is a 77y old  Female who presents with a chief complaint of sigmoid colon resection (03-21-18)   was seen and examined today. Reports the following symptoms:    CONSTITUTIONAL:  Negative fever or chills, feels well, good appetite  EYES:  Negative  blurry vision or double vision  CARDIOVASCULAR:  Negative for chest pain or palpitations  RESPIRATORY:  Negative for cough, wheezing, or SOB   GASTROINTESTINAL:  Negative for nausea, vomiting, diarrhea, constipation, or abdominal pain  GENITOURINARY:  Negative frequency, urgency or dysuria  NEUROLOGIC:  No headache, confusion, dizziness, lightheadedness    MEDICATIONS  (STANDING):  ATENolol  Tablet 25 milliGRAM(s) Oral daily  buDESOnide 160 MICROgram(s)/formoterol 4.5 MICROgram(s) Inhaler 2 Puff(s) Inhalation two times a day  heparin  Injectable 5000 Unit(s) SubCutaneous every 8 hours  insulin lispro (HumaLOG) corrective regimen sliding scale   SubCutaneous Before meals and at bedtime  lactated ringers. 1000 milliLiter(s) (110 mL/Hr) IV Continuous <Continuous>  pantoprazole    Tablet 40 milliGRAM(s) Oral before breakfast  simvastatin 20 milliGRAM(s) Oral at bedtime  tiotropium 18 MICROgram(s) Capsule 1 Capsule(s) Inhalation daily    MEDICATIONS  (PRN):  artificial  tears Solution 1 Drop(s) Both EYES three times a day PRN Dry Eyes  ketorolac   Injectable 30 milliGRAM(s) IV Push every 6 hours PRN Moderate Pain  metoclopramide Injectable 10 milliGRAM(s) IV Push once PRN Nausea and/or Vomiting  ondansetron Injectable 4 milliGRAM(s) IV Push every 6 hours PRN Nausea        LABS:                        10.8   8.9   )-----------( 238      ( 24 Mar 2018 06:43 )             34.1     03-24    141  |  105  |  13  ----------------------------<  130<H>  3.6   |  27  |  0.70    Ca    8.1<L>      24 Mar 2018 06:43  Phos  2.3     03-24  Mg     2.1     03-24      Hemoglobin A1C, Whole Blood: 6.7 % (03-24-18 @ 02:34)            RADIOLOGY & ADDITIONAL TESTS:      Vital Signs Last 24 Hrs  T(C): 36.8 (24 Mar 2018 22:35), Max: 37.4 (24 Mar 2018 17:36)  T(F): 98.2 (24 Mar 2018 22:35), Max: 99.4 (24 Mar 2018 17:36)  HR: 76 (24 Mar 2018 22:35) (66 - 93)  BP: 162/75 (24 Mar 2018 22:35) (144/71 - 170/99)  BP(mean): --  RR: 18 (24 Mar 2018 22:35) (16 - 18)  SpO2: 97% (24 Mar 2018 22:35) (93% - 97%)    CAPILLARY BLOOD GLUCOSE      PHYSICAL EXAM:  Constitutional: wn/wd in NAD.   HEENT: NCAT, MMM, OP clear, EOMI, , no proptosis or lid retraction  Neck: supple, no acanthosis, no thyromegaly or palpable thyroid nodules   Respiratory: Lungs CTAB.  Cardiovascular: regular rhythm, normal S1 and S2, no audible murmurs, no peripheral edema  GI: soft, + bowel sounds, NT/ND, no masses/HSM appreciated.  Neurology: no tremors, DTR 2+  Skin: no visible rashes/lesions  Psychiatric: AAO x 3, normal affect/mood.        A/P: 77yFemale admitted for (    ). Consulted and being followed for Diabetes Type (  ).       Uncontrolled DM Type (  ) -     Please continue           units lantus AM/PM, premeal Lispro  (  ) units TID, and  Lispro moderate/low dose sliding scale 4 times daily (before meals and bedtime).  Please continue consistent carbohydrate (Diabetic) diet, FS ac & hs. Target  - 150.      Case discussed with attending and primary team      Attending attestation:  I have seen and evaluated the patient at the bedside.   Endocrine Nurse Practitioner/Fellow/Resident’s note reviewed, including vital signs, PE and laboratory results.  Direct personal management and extensive interpretation of the data conducted.   I agree with the Nurse Practitioner/Fellow/Resident’s assessment and recommendations as above. INTERVAL HPI/OVERNIGHT EVENTS:      Patient is a 77y old female who presents with a chief complaint of sigmoid colon resection (03-21-18)  was seen and examined today. Reports the following symptoms:    CONSTITUTIONAL:  Negative fever or chills, feels better, increased appetite  EYES:  Negative  blurry vision or double vision  CARDIOVASCULAR:  Negative for chest pain or palpitations  RESPIRATORY:  Negative for cough, wheezing, or SOB   GASTROINTESTINAL:  Negative for nausea, vomiting, diarrhea, constipation, or abdominal pain  GENITOURINARY:  Negative frequency, urgency or dysuria  NEUROLOGIC:  No headache, confusion, dizziness, lightheadedness    MEDICATIONS  (STANDING):  ATENolol  Tablet 25 milliGRAM(s) Oral daily  buDESOnide 160 MICROgram(s)/formoterol 4.5 MICROgram(s) Inhaler 2 Puff(s) Inhalation two times a day  heparin  Injectable 5000 Unit(s) SubCutaneous every 8 hours  insulin lispro (HumaLOG) corrective regimen sliding scale   SubCutaneous Before meals and at bedtime  lactated ringers. 1000 milliLiter(s) (110 mL/Hr) IV Continuous <Continuous>  pantoprazole    Tablet 40 milliGRAM(s) Oral before breakfast  simvastatin 20 milliGRAM(s) Oral at bedtime  tiotropium 18 MICROgram(s) Capsule 1 Capsule(s) Inhalation daily    MEDICATIONS  (PRN):  artificial  tears Solution 1 Drop(s) Both EYES three times a day PRN Dry Eyes  ketorolac   Injectable 30 milliGRAM(s) IV Push every 6 hours PRN Moderate Pain  metoclopramide Injectable 10 milliGRAM(s) IV Push once PRN Nausea and/or Vomiting  ondansetron Injectable 4 milliGRAM(s) IV Push every 6 hours PRN Nausea        LABS:                        10.8   8.9   )-----------( 238      ( 24 Mar 2018 06:43 )             34.1     03-24    141  |  105  |  13  ----------------------------<  130<H>  3.6   |  27  |  0.70    Ca    8.1<L>      24 Mar 2018 06:43  Phos  2.3     03-24  Mg     2.1     03-24      Hemoglobin A1C, Whole Blood: 6.7 % (03-24-18 @ 02:34)            RADIOLOGY & ADDITIONAL TESTS:      Vital Signs Last 24 Hrs  T(C): 36.8 (24 Mar 2018 22:35), Max: 37.4 (24 Mar 2018 17:36)  T(F): 98.2 (24 Mar 2018 22:35), Max: 99.4 (24 Mar 2018 17:36)  HR: 76 (24 Mar 2018 22:35) (66 - 93)  BP: 162/75 (24 Mar 2018 22:35) (144/71 - 170/99)  BP(mean): --  RR: 18 (24 Mar 2018 22:35) (16 - 18)  SpO2: 97% (24 Mar 2018 22:35) (93% - 97%)    CAPILLARY BLOOD GLUCOSE      PHYSICAL EXAM:  Constitutional: wn/wd in NAD.   HEENT: NCAT, MMM, OP clear, EOMI, no proptosis or lid retraction  Neck: supple, no acanthosis, no thyromegaly or palpable thyroid nodules   Respiratory: Lungs CTAB.  Cardiovascular: regular rhythm, normal S1 and S2, no audible murmurs, no peripheral edema  GI: soft, + bowel sounds, NT/ND.  Neurology: no tremors, DTR 1+  Skin: no visible rashes/lesions  Psychiatric: AAO x 3, normal affect/mood.        A/P: 77yFemale admitted for sigmoid colon resection. Consulted and being followed for Diabetes Type 2.     DM Type 2 is mild (HbA1c 6.7%) and managed with diet and rapid acting insulin correctional sliding scale.    Please continue Lispro moderate/low dose sliding scale 4 times daily (before meals and bedtime).    Also continue consistent carbohydrate (Diabetic) diet, FS ac & hs. Target  - 150 mg/dl.   Resume outpatient diabetic regimen and f/u with PCP.     Case discussed with attending and primary team.      Attending attestation:  I have seen and evaluated the patient at the bedside.   Nurse Practitioner/Fellow/Resident’s note reviewed, including vital signs, PE and laboratory results.  Direct personal management and extensive interpretation of the data conducted.   Assessment and recommendations as above.

## 2018-03-24 NOTE — PROGRESS NOTE ADULT - SUBJECTIVE AND OBJECTIVE BOX
78F found to have rectal polyp that was suspicious on imaging for a malignancy, now here for sigmoidectomy.    Colon polyp    COPD with asthma    GERD (gastroesophageal reflux disease)    Hyperlipidemia    Hypertension    Spinal stenosis.    Past Surgical History:  History of bilateral hip replacements    History of laminectomy  2013.      	  MEDICATIONS:  ATENolol  Tablet 25 milliGRAM(s) Oral daily      buDESOnide 160 MICROgram(s)/formoterol 4.5 MICROgram(s) Inhaler 2 Puff(s) Inhalation two times a day  tiotropium 18 MICROgram(s) Capsule 1 Capsule(s) Inhalation daily    ketorolac   Injectable 30 milliGRAM(s) IV Push every 6 hours PRN  metoclopramide Injectable 10 milliGRAM(s) IV Push once PRN  ondansetron Injectable 4 milliGRAM(s) IV Push every 6 hours PRN    pantoprazole    Tablet 40 milliGRAM(s) Oral before breakfast    insulin lispro (HumaLOG) corrective regimen sliding scale   SubCutaneous Before meals and at bedtime  simvastatin 20 milliGRAM(s) Oral at bedtime    artificial  tears Solution 1 Drop(s) Both EYES three times a day PRN  heparin  Injectable 5000 Unit(s) SubCutaneous every 8 hours  lactated ringers. 1000 milliLiter(s) IV Continuous <Continuous>      Complaint:     Otherwise 12 point review of systems is normal.    PHYSICAL EXAM:    Constitutional: NAD  Eyes: PERRL, EOMI, sclera non-icteric  Neck: supple, no masses, no JVD  Respiratory: CTA b/l, good air entry b/l, no wheezing, rhonchi, rales  Cardiovascular: RRR, normal S1S2, no M/R/G  Gastrointestinal: soft, NTND, +BM  Extremities:  no c/c/e  Neurological: AAOx3    ICU Vital Signs Last 24 Hrs  T(C): 36.1 (24 Mar 2018 14:16), Max: 37.3 (24 Mar 2018 09:05)  T(F): 97 (24 Mar 2018 14:16), Max: 99.1 (24 Mar 2018 09:05)  HR: 66 (24 Mar 2018 09:05) (66 - 84)  BP: 144/71 (24 Mar 2018 14:16) (134/70 - 155/82)  BP(mean): --  ABP: --  ABP(mean): --  RR: 16 (24 Mar 2018 14:16) (14 - 17)  SpO2: 96% (24 Mar 2018 14:16) (93% - 97%)              ECG:      CHEST X RAY    CT    MRI    MRA    CT ANGIO    CAROTID DUPLEX    DUPLEX     Echocardiogram    Catheterization:    Stress Test:     LABS:	 	  CARDIAC MARKERS:                              10.8   8.9   )-----------( 238      ( 24 Mar 2018 06:43 )             34.1     03-24    141  |  105  |  13  ----------------------------<  130<H>  3.6   |  27  |  0.70    Ca    8.1<L>      24 Mar 2018 06:43  Phos  2.3     03-24  Mg     2.1     03-24      proBNP:   Lipid Profile:   HgA1c: Hemoglobin A1C, Whole Blood: 6.7 % (03-24 @ 02:34)      ASSESSMENT/PLAN: 	    S/P sigmoidectomy  Increase diet

## 2018-03-25 LAB
ANION GAP SERPL CALC-SCNC: 9 MMOL/L — SIGNIFICANT CHANGE UP (ref 5–17)
BUN SERPL-MCNC: 10 MG/DL — SIGNIFICANT CHANGE UP (ref 7–23)
CALCIUM SERPL-MCNC: 9.3 MG/DL — SIGNIFICANT CHANGE UP (ref 8.4–10.5)
CHLORIDE SERPL-SCNC: 102 MMOL/L — SIGNIFICANT CHANGE UP (ref 96–108)
CO2 SERPL-SCNC: 33 MMOL/L — HIGH (ref 22–31)
CREAT SERPL-MCNC: 0.61 MG/DL — SIGNIFICANT CHANGE UP (ref 0.5–1.3)
GLUCOSE BLDC GLUCOMTR-MCNC: 109 MG/DL — HIGH (ref 70–99)
GLUCOSE BLDC GLUCOMTR-MCNC: 111 MG/DL — HIGH (ref 70–99)
GLUCOSE BLDC GLUCOMTR-MCNC: 126 MG/DL — HIGH (ref 70–99)
GLUCOSE BLDC GLUCOMTR-MCNC: 138 MG/DL — HIGH (ref 70–99)
GLUCOSE SERPL-MCNC: 118 MG/DL — HIGH (ref 70–99)
HCT VFR BLD CALC: 38.6 % — SIGNIFICANT CHANGE UP (ref 34.5–45)
HGB BLD-MCNC: 12.1 G/DL — SIGNIFICANT CHANGE UP (ref 11.5–15.5)
MAGNESIUM SERPL-MCNC: 1.9 MG/DL — SIGNIFICANT CHANGE UP (ref 1.6–2.6)
MCHC RBC-ENTMCNC: 29.7 PG — SIGNIFICANT CHANGE UP (ref 27–34)
MCHC RBC-ENTMCNC: 31.3 G/DL — LOW (ref 32–36)
MCV RBC AUTO: 94.6 FL — SIGNIFICANT CHANGE UP (ref 80–100)
PHOSPHATE SERPL-MCNC: 3 MG/DL — SIGNIFICANT CHANGE UP (ref 2.5–4.5)
PLATELET # BLD AUTO: 241 K/UL — SIGNIFICANT CHANGE UP (ref 150–400)
POTASSIUM SERPL-MCNC: 3.5 MMOL/L — SIGNIFICANT CHANGE UP (ref 3.5–5.3)
POTASSIUM SERPL-SCNC: 3.5 MMOL/L — SIGNIFICANT CHANGE UP (ref 3.5–5.3)
RBC # BLD: 4.08 M/UL — SIGNIFICANT CHANGE UP (ref 3.8–5.2)
RBC # FLD: 13.7 % — SIGNIFICANT CHANGE UP (ref 10.3–16.9)
SODIUM SERPL-SCNC: 144 MMOL/L — SIGNIFICANT CHANGE UP (ref 135–145)
WBC # BLD: 8.2 K/UL — SIGNIFICANT CHANGE UP (ref 3.8–10.5)
WBC # FLD AUTO: 8.2 K/UL — SIGNIFICANT CHANGE UP (ref 3.8–10.5)

## 2018-03-25 RX ORDER — OXYCODONE AND ACETAMINOPHEN 5; 325 MG/1; MG/1
1 TABLET ORAL EVERY 6 HOURS
Qty: 0 | Refills: 0 | Status: DISCONTINUED | OUTPATIENT
Start: 2018-03-25 | End: 2018-03-26

## 2018-03-25 RX ORDER — ACETAMINOPHEN 500 MG
650 TABLET ORAL EVERY 6 HOURS
Qty: 0 | Refills: 0 | Status: DISCONTINUED | OUTPATIENT
Start: 2018-03-25 | End: 2018-03-26

## 2018-03-25 RX ORDER — POTASSIUM CHLORIDE 20 MEQ
40 PACKET (EA) ORAL ONCE
Qty: 0 | Refills: 0 | Status: COMPLETED | OUTPATIENT
Start: 2018-03-25 | End: 2018-03-25

## 2018-03-25 RX ADMIN — HEPARIN SODIUM 5000 UNIT(S): 5000 INJECTION INTRAVENOUS; SUBCUTANEOUS at 22:32

## 2018-03-25 RX ADMIN — ATENOLOL 25 MILLIGRAM(S): 25 TABLET ORAL at 05:58

## 2018-03-25 RX ADMIN — Medication 40 MILLIEQUIVALENT(S): at 12:44

## 2018-03-25 RX ADMIN — PANTOPRAZOLE SODIUM 40 MILLIGRAM(S): 20 TABLET, DELAYED RELEASE ORAL at 05:58

## 2018-03-25 RX ADMIN — SIMVASTATIN 20 MILLIGRAM(S): 20 TABLET, FILM COATED ORAL at 22:32

## 2018-03-25 RX ADMIN — SODIUM CHLORIDE 110 MILLILITER(S): 9 INJECTION, SOLUTION INTRAVENOUS at 04:44

## 2018-03-25 RX ADMIN — Medication 30 MILLIGRAM(S): at 06:07

## 2018-03-25 RX ADMIN — HEPARIN SODIUM 5000 UNIT(S): 5000 INJECTION INTRAVENOUS; SUBCUTANEOUS at 05:58

## 2018-03-25 RX ADMIN — BUDESONIDE AND FORMOTEROL FUMARATE DIHYDRATE 2 PUFF(S): 160; 4.5 AEROSOL RESPIRATORY (INHALATION) at 07:52

## 2018-03-25 RX ADMIN — SODIUM CHLORIDE 110 MILLILITER(S): 9 INJECTION, SOLUTION INTRAVENOUS at 22:32

## 2018-03-25 NOTE — PROGRESS NOTE ADULT - SUBJECTIVE AND OBJECTIVE BOX
78F found to have rectal polyp that was suspicious on imaging for a malignancy, now here for sigmoidectomy.    Colon polyp    COPD with asthma    GERD (gastroesophageal reflux disease)    Hyperlipidemia    Hypertension    Spinal stenosis.    Past Surgical History:  History of bilateral hip replacements    History of laminectomy  2013.          	  MEDICATIONS:  ATENolol  Tablet 25 milliGRAM(s) Oral daily      buDESOnide 160 MICROgram(s)/formoterol 4.5 MICROgram(s) Inhaler 2 Puff(s) Inhalation two times a day  tiotropium 18 MICROgram(s) Capsule 1 Capsule(s) Inhalation daily    ketorolac   Injectable 30 milliGRAM(s) IV Push every 6 hours PRN  metoclopramide Injectable 10 milliGRAM(s) IV Push once PRN  ondansetron Injectable 4 milliGRAM(s) IV Push every 6 hours PRN    pantoprazole    Tablet 40 milliGRAM(s) Oral before breakfast    insulin lispro (HumaLOG) corrective regimen sliding scale   SubCutaneous Before meals and at bedtime  simvastatin 20 milliGRAM(s) Oral at bedtime    artificial  tears Solution 1 Drop(s) Both EYES three times a day PRN  heparin  Injectable 5000 Unit(s) SubCutaneous every 8 hours  lactated ringers. 1000 milliLiter(s) IV Continuous <Continuous>      Complaint:     Otherwise 12 point review of systems is normal.    PHYSICAL EXAM:      Constitutional: NAD  Eyes: PERRL, EOMI, sclera non-icteric  Neck: supple, no masses, no JVD  Respiratory: CTA b/l, good air entry b/l, no wheezing, rhonchi, rales  Cardiovascular: RRR, normal S1S2, no M/R/G  Gastrointestinal: soft, NTND, +BM  Extremities:  no c/c/e  Neurological: AAOx3      ICU Vital Signs Last 24 Hrs  T(C): 36.7 (25 Mar 2018 17:54), Max: 37.1 (25 Mar 2018 06:02)  T(F): 98.1 (25 Mar 2018 17:54), Max: 98.8 (25 Mar 2018 06:02)  HR: 71 (25 Mar 2018 17:54) (71 - 84)  BP: 166/82 (25 Mar 2018 17:54) (160/83 - 171/84)  BP(mean): --  ABP: --  ABP(mean): --  RR: 17 (25 Mar 2018 17:54) (16 - 18)  SpO2: 95% (25 Mar 2018 17:54) (95% - 97%)            ECG:      CHEST X RAY    CT    MRI    MRA    CT ANGIO    CAROTID DUPLEX    DUPLEX     Echocardiogram    Catheterization:    Stress Test:     LABS:	 	  CARDIAC MARKERS:                              12.1   8.2   )-----------( 241      ( 25 Mar 2018 06:40 )             38.6     03-25    144  |  102  |  10  ----------------------------<  118<H>  3.5   |  33<H>  |  0.61    Ca    9.3      25 Mar 2018 06:40  Phos  3.0     03-25  Mg     1.9     03-25      proBNP:   Lipid Profile:   HgA1c: Hemoglobin A1C, Whole Blood: 6.7 % (03-24 @ 02:34)              ASSESSMENT/PLAN: 	    S/P sigmoidectomy  Advance diet  Discharge planning in progress

## 2018-03-25 NOTE — PROGRESS NOTE ADULT - SUBJECTIVE AND OBJECTIVE BOX
3/24 / o/n: phi, will remain on CLD   o/n: PHI          78F s/p laparoscopic sigmoidectomy, BSO, cystoscopy and ureteral stent placement, s/p removal of both stents at end of case.  pain control   dvt ppx   monitor gi fxn   oob   cld   advance to LRD 3/24 / o/n: phi, will remain on CLD   o/n: PHI    INTERVAL HPI/OVERNIGHT EVENTS:    STATUS POST:  lap sigmoidectomy bso    POST OPERATIVE DAY #:     SUBJECTIVE:  Flatus: [ ] YES [ ] NO             Bowel Movement: [ ] YES [ ] NO  Pain (0-10):            Pain Control Adequate: [ ] YES [ ] NO  Nausea: [ ] YES [ ] NO            Vomiting: [ ] YES [ ] NO  Diarrhea: [ ] YES [ ] NO         Constipation: [ ] YES [ ] NO     Chest Pain: [ ] YES [ ] NO    SOB:  [ ] YES [ ] NO    MEDICATIONS  (STANDING):  ATENolol  Tablet 25 milliGRAM(s) Oral daily  buDESOnide 160 MICROgram(s)/formoterol 4.5 MICROgram(s) Inhaler 2 Puff(s) Inhalation two times a day  heparin  Injectable 5000 Unit(s) SubCutaneous every 8 hours  insulin lispro (HumaLOG) corrective regimen sliding scale   SubCutaneous Before meals and at bedtime  lactated ringers. 1000 milliLiter(s) (110 mL/Hr) IV Continuous <Continuous>  pantoprazole    Tablet 40 milliGRAM(s) Oral before breakfast  simvastatin 20 milliGRAM(s) Oral at bedtime  tiotropium 18 MICROgram(s) Capsule 1 Capsule(s) Inhalation daily    MEDICATIONS  (PRN):  artificial  tears Solution 1 Drop(s) Both EYES three times a day PRN Dry Eyes  ketorolac   Injectable 30 milliGRAM(s) IV Push every 6 hours PRN Moderate Pain  metoclopramide Injectable 10 milliGRAM(s) IV Push once PRN Nausea and/or Vomiting  ondansetron Injectable 4 milliGRAM(s) IV Push every 6 hours PRN Nausea      Vital Signs Last 24 Hrs  T(C): 36.7 (25 Mar 2018 13:47), Max: 37.1 (25 Mar 2018 06:02)  T(F): 98 (25 Mar 2018 13:47), Max: 98.8 (25 Mar 2018 06:02)  HR: 73 (25 Mar 2018 13:47) (72 - 84)  BP: 165/84 (25 Mar 2018 13:47) (160/83 - 171/84)  BP(mean): --  RR: 16 (25 Mar 2018 13:47) (16 - 18)  SpO2: 95% (25 Mar 2018 13:47) (95% - 97%)    PHYSICAL EXAM:      Constitutional: A&Ox3    Respiratory: non labored breathing, no respiratory distress    Cardiovascular:  RRR    Gastrointestinal: soft nt nd                 Incision:    Genitourinary:    Extremities: (-) edema                  I&O's Detail    24 Mar 2018 07:01  -  25 Mar 2018 07:00  --------------------------------------------------------  IN:    lactated ringers.: 2530 mL    Oral Fluid: 500 mL  Total IN: 3030 mL    OUT:    Voided: 450 mL  Total OUT: 450 mL    Total NET: 2580 mL      25 Mar 2018 07:01  -  25 Mar 2018 17:42  --------------------------------------------------------  IN:    lactated ringers.: 990 mL    Oral Fluid: 580 mL  Total IN: 1570 mL    OUT:    Voided: 300 mL  Total OUT: 300 mL    Total NET: 1270 mL          LABS:                        12.1   8.2   )-----------( 241      ( 25 Mar 2018 06:40 )             38.6     03-25    144  |  102  |  10  ----------------------------<  118<H>  3.5   |  33<H>  |  0.61    Ca    9.3      25 Mar 2018 06:40  Phos  3.0     03-25  Mg     1.9     03-25            RADIOLOGY & ADDITIONAL STUDIES:        78F s/p laparoscopic sigmoidectomy, BSO, cystoscopy and ureteral stent placement, s/p removal of both stents at end of case.  pain control   dvt ppx   monitor gi fxn   oob    advance to LRD

## 2018-03-25 NOTE — PROGRESS NOTE ADULT - SUBJECTIVE AND OBJECTIVE BOX
INTERVAL HPI/OVERNIGHT EVENTS:      Patient is a 77y old  Female who presents with a chief complaint of sigmoid colon resection (03-21-18)  was seen and examined today. Reports the following symptoms:    CONSTITUTIONAL:  Negative fever or chills, feels well, good appetite  EYES:  Negative  blurry vision or double vision  CARDIOVASCULAR:  Negative for chest pain or palpitations  RESPIRATORY:  Negative for cough, wheezing, or SOB   GASTROINTESTINAL:  Negative for nausea, vomiting, diarrhea, constipation, mild RLQ abdominal pain  GENITOURINARY:  Negative frequency, urgency or dysuria  NEUROLOGIC:  No headache, confusion, dizziness, lightheadedness    MEDICATIONS  (STANDING):  ATENolol  Tablet 25 milliGRAM(s) Oral daily  buDESOnide 160 MICROgram(s)/formoterol 4.5 MICROgram(s) Inhaler 2 Puff(s) Inhalation two times a day  heparin  Injectable 5000 Unit(s) SubCutaneous every 8 hours  insulin lispro (HumaLOG) corrective regimen sliding scale   SubCutaneous Before meals and at bedtime  lactated ringers. 1000 milliLiter(s) (110 mL/Hr) IV Continuous <Continuous>  pantoprazole    Tablet 40 milliGRAM(s) Oral before breakfast  simvastatin 20 milliGRAM(s) Oral at bedtime  tiotropium 18 MICROgram(s) Capsule 1 Capsule(s) Inhalation daily    MEDICATIONS  (PRN):  artificial  tears Solution 1 Drop(s) Both EYES three times a day PRN Dry Eyes  ketorolac   Injectable 30 milliGRAM(s) IV Push every 6 hours PRN Moderate Pain  metoclopramide Injectable 10 milliGRAM(s) IV Push once PRN Nausea and/or Vomiting  ondansetron Injectable 4 milliGRAM(s) IV Push every 6 hours PRN Nausea        LABS:                        12.1   8.2   )-----------( 241      ( 25 Mar 2018 06:40 )             38.6     03-25    144  |  102  |  10  ----------------------------<  118<H>  3.5   |  33<H>  |  0.61    Ca    9.3      25 Mar 2018 06:40  Phos  3.0     03-25  Mg     1.9     03-25      Hemoglobin A1C, Whole Blood: 6.7 % (03-24-18 @ 02:34)            RADIOLOGY & ADDITIONAL TESTS:      Vital Signs Last 24 Hrs  T(C): 36.7 (25 Mar 2018 09:10), Max: 37.4 (24 Mar 2018 17:36)  T(F): 98 (25 Mar 2018 09:10), Max: 99.4 (24 Mar 2018 17:36)  HR: 72 (25 Mar 2018 09:10) (72 - 93)  BP: 160/83 (25 Mar 2018 09:10) (144/71 - 171/84)  BP(mean): --  RR: 16 (25 Mar 2018 09:10) (16 - 18)  SpO2: 97% (25 Mar 2018 09:10) (94% - 97%)    CAPILLARY BLOOD GLUCOSE      PHYSICAL EXAM:  Constitutional: wn/wd in NAD.   HEENT: NCAT, MMM, OP clear, EOMI, no proptosis or lid retraction  Neck: supple, no acanthosis, no thyromegaly or palpable thyroid nodules   Respiratory: Lungs CTAB.  Cardiovascular: regular rhythm, normal S1 and S2, no audible murmurs, no peripheral edema  GI: soft, + bowel sounds, RLQ tenderness  Neurology: no tremors, DTR 2+  Skin: no visible rashes/lesions  Psychiatric: AAO x 3, normal affect/mood.        A/P: 77yFemale admitted for sigmoid colon resection. Consulted and being followed for hyperglycemia.     REC:   Please continue Lispro moderate/low dose sliding scale 4 times daily (before meals and bedtime).    Please advance to solid, consistent carbohydrate (Diabetic) diet, FS ac & hs. Target  - 150 mg/dl.    Outpatient f/u with PCP.    Case discussed with attending and primary team.      Attending attestation:  I have seen and evaluated the patient at the bedside.   Nurse Practitioner/Fellow/Resident’s note reviewed, including vital signs, PE and laboratory results.  Direct personal management and extensive interpretation of the data conducted.   Assessment and recommendations as above.

## 2018-03-26 ENCOUNTER — TRANSCRIPTION ENCOUNTER (OUTPATIENT)
Age: 78
End: 2018-03-26

## 2018-03-26 VITALS
RESPIRATION RATE: 17 BRPM | TEMPERATURE: 98 F | HEART RATE: 82 BPM | DIASTOLIC BLOOD PRESSURE: 78 MMHG | OXYGEN SATURATION: 98 % | SYSTOLIC BLOOD PRESSURE: 152 MMHG

## 2018-03-26 LAB
GLUCOSE BLDC GLUCOMTR-MCNC: 118 MG/DL — HIGH (ref 70–99)
GLUCOSE BLDC GLUCOMTR-MCNC: 121 MG/DL — HIGH (ref 70–99)

## 2018-03-26 PROCEDURE — 88304 TISSUE EXAM BY PATHOLOGIST: CPT

## 2018-03-26 PROCEDURE — 80048 BASIC METABOLIC PNL TOTAL CA: CPT

## 2018-03-26 PROCEDURE — 86900 BLOOD TYPING SEROLOGIC ABO: CPT

## 2018-03-26 PROCEDURE — 84132 ASSAY OF SERUM POTASSIUM: CPT

## 2018-03-26 PROCEDURE — 86850 RBC ANTIBODY SCREEN: CPT

## 2018-03-26 PROCEDURE — 84295 ASSAY OF SERUM SODIUM: CPT

## 2018-03-26 PROCEDURE — 82330 ASSAY OF CALCIUM: CPT

## 2018-03-26 PROCEDURE — 86901 BLOOD TYPING SEROLOGIC RH(D): CPT

## 2018-03-26 PROCEDURE — 85027 COMPLETE CBC AUTOMATED: CPT

## 2018-03-26 PROCEDURE — 83036 HEMOGLOBIN GLYCOSYLATED A1C: CPT

## 2018-03-26 PROCEDURE — 83735 ASSAY OF MAGNESIUM: CPT

## 2018-03-26 PROCEDURE — 36415 COLL VENOUS BLD VENIPUNCTURE: CPT

## 2018-03-26 PROCEDURE — 94640 AIRWAY INHALATION TREATMENT: CPT

## 2018-03-26 PROCEDURE — 82962 GLUCOSE BLOOD TEST: CPT

## 2018-03-26 PROCEDURE — 88309 TISSUE EXAM BY PATHOLOGIST: CPT

## 2018-03-26 PROCEDURE — 85018 HEMOGLOBIN: CPT

## 2018-03-26 PROCEDURE — 85025 COMPLETE CBC W/AUTO DIFF WBC: CPT

## 2018-03-26 PROCEDURE — 84100 ASSAY OF PHOSPHORUS: CPT

## 2018-03-26 PROCEDURE — 88307 TISSUE EXAM BY PATHOLOGIST: CPT

## 2018-03-26 RX ORDER — DOCUSATE SODIUM 100 MG
1 CAPSULE ORAL
Qty: 20 | Refills: 1 | OUTPATIENT
Start: 2018-03-26 | End: 2018-04-14

## 2018-03-26 RX ORDER — ERYTHROMYCIN ETHYLSUCCINATE 400 MG
2 TABLET ORAL
Qty: 0 | Refills: 0 | COMMUNITY

## 2018-03-26 RX ORDER — NEOMYCIN SULFATE 500 MG/1
2 TABLET ORAL
Qty: 0 | Refills: 0 | COMMUNITY

## 2018-03-26 RX ADMIN — TIOTROPIUM BROMIDE 1 CAPSULE(S): 18 CAPSULE ORAL; RESPIRATORY (INHALATION) at 11:27

## 2018-03-26 RX ADMIN — PANTOPRAZOLE SODIUM 40 MILLIGRAM(S): 20 TABLET, DELAYED RELEASE ORAL at 05:32

## 2018-03-26 RX ADMIN — Medication 650 MILLIGRAM(S): at 05:32

## 2018-03-26 RX ADMIN — HEPARIN SODIUM 5000 UNIT(S): 5000 INJECTION INTRAVENOUS; SUBCUTANEOUS at 06:00

## 2018-03-26 RX ADMIN — BUDESONIDE AND FORMOTEROL FUMARATE DIHYDRATE 2 PUFF(S): 160; 4.5 AEROSOL RESPIRATORY (INHALATION) at 05:33

## 2018-03-26 RX ADMIN — ATENOLOL 25 MILLIGRAM(S): 25 TABLET ORAL at 05:18

## 2018-03-26 RX ADMIN — Medication 650 MILLIGRAM(S): at 06:33

## 2018-03-26 NOTE — DISCHARGE NOTE ADULT - PLAN OF CARE
Full recovery -Continue eating your regular diet as tolerated and drinking plenty of fluids.   -For pain, you may take over-the-counter Tylenol as labeled. For breakthrough pain you may take Percocet, which contains Tylenol. Do NOT exceed 4000mg acetaminophen/Tylenol total within 24 hours. Do NOT take Advil, Motrin, or NSAIDs. Please take Colace 1 tab twice daily while taking narcotic pain medication to avoid constipation.  -No heavy lifting >20 pounds or strenuous exercise.  -You may remove your bandages 48 hours after surgery. Please keep wounds clean and dry.   -You may shower but NO baths and NO swimming. Keep your incisions clean & dry. Avoid a direct stream of water over incision sites. Do not scrub. Pat dry when done.  -Do not remove any Steri-strips; they will fall off on their own after a few showers. Please follow up with Dr. Barrientos in one week; you may call the office to make an appointment at your earliest convenience.   Contact your doctor or go to the ER for fever > 101.5, chills, nausea, vomiting, chest pain, shortness of breath, pain not controlled by medication or excessive bleeding.

## 2018-03-26 NOTE — PROGRESS NOTE ADULT - SUBJECTIVE AND OBJECTIVE BOX
o/n: FRANCOISE, tolerating diet, +BM  3/25 Advanced to LR diet o/n: FRANCOISE, tolerating diet, +BM  3/25 Advanced to LR diet     SUBJECTIVE: Patient feeling well. Patient seen and examined bedside by chief resident.    ATENolol  Tablet 25 milliGRAM(s) Oral daily  heparin  Injectable 5000 Unit(s) SubCutaneous every 8 hours      Vital Signs Last 24 Hrs  T(C): 36.6 (26 Mar 2018 06:09), Max: 36.7 (25 Mar 2018 09:10)  T(F): 97.8 (26 Mar 2018 06:09), Max: 98.1 (25 Mar 2018 17:54)  HR: 88 (26 Mar 2018 06:09) (71 - 88)  BP: 171/89 (26 Mar 2018 06:09) (160/83 - 171/89)  BP(mean): --  RR: 17 (26 Mar 2018 06:09) (16 - 17)  SpO2: 98% (26 Mar 2018 06:09) (95% - 98%)  I&O's Detail    25 Mar 2018 07:01  -  26 Mar 2018 07:00  --------------------------------------------------------  IN:    lactated ringers.: 2090 mL    Oral Fluid: 580 mL  Total IN: 2670 mL    OUT:    Voided: 750 mL  Total OUT: 750 mL    Total NET: 1920 mL          General: NAD, resting comfortably in bed  C/V: NSR  Pulm: Nonlabored breathing, no respiratory distress  Abd: soft, NT/ND.  Extrem: WWP, SCDs in place        LABS:                        12.1   8.2   )-----------( 241      ( 25 Mar 2018 06:40 )             38.6     03-25    144  |  102  |  10  ----------------------------<  118<H>  3.5   |  33<H>  |  0.61    Ca    9.3      25 Mar 2018 06:40  Phos  3.0     03-25  Mg     1.9     03-25            RADIOLOGY & ADDITIONAL STUDIES:

## 2018-03-26 NOTE — DISCHARGE NOTE ADULT - HOSPITAL COURSE
Patient is a 78 yo female found to have a rectal polyp now status post sigmoidectomy, BSO, cystoscopy. Patient's post-operative course was uncomplicated. Diet was advanced as tolerated and pain was well controlled on medication. On day of discharge, pt deemed stable and ready to return home with plan to follow up as an outpatient.

## 2018-03-26 NOTE — DISCHARGE NOTE ADULT - CARE PLAN
Principal Discharge DX:	Colon polyp  Goal:	Full recovery  Assessment and plan of treatment:	-Continue eating your regular diet as tolerated and drinking plenty of fluids.   -For pain, you may take over-the-counter Tylenol as labeled. For breakthrough pain you may take Percocet, which contains Tylenol. Do NOT exceed 4000mg acetaminophen/Tylenol total within 24 hours. Do NOT take Advil, Motrin, or NSAIDs. Please take Colace 1 tab twice daily while taking narcotic pain medication to avoid constipation.  -No heavy lifting >20 pounds or strenuous exercise.  -You may remove your bandages 48 hours after surgery. Please keep wounds clean and dry.   -You may shower but NO baths and NO swimming. Keep your incisions clean & dry. Avoid a direct stream of water over incision sites. Do not scrub. Pat dry when done.  -Do not remove any Steri-strips; they will fall off on their own after a few showers. Please follow up with Dr. Barrientos in one week; you may call the office to make an appointment at your earliest convenience.   Contact your doctor or go to the ER for fever > 101.5, chills, nausea, vomiting, chest pain, shortness of breath, pain not controlled by medication or excessive bleeding.

## 2018-03-26 NOTE — DISCHARGE NOTE ADULT - MEDICATION SUMMARY - MEDICATIONS TO STOP TAKING
I will STOP taking the medications listed below when I get home from the hospital:    erythromycin 500 mg oral tablet  -- 1 tab(s) by mouth 3 times   PRE OP    neomycin 500 mg oral tablet  -- 2 tab(s) by mouth 3 times PRE OP

## 2018-03-26 NOTE — DISCHARGE NOTE ADULT - PATIENT PORTAL LINK FT
You can access the [facility] Patient Portal, offered by [facility], by registering with the following website: [email or web address]

## 2018-03-26 NOTE — PROGRESS NOTE ADULT - ASSESSMENT
A/P: 78F s/p laparoscopic sigmoidectomy, BSO, cystoscopy and ureteral stent placement, s/p removal of both stents at end of case.    1. s/p surgery  - clears / IVF  - pain control: hPCA  - OOBA / DVT PPx / IS    2. DM  - ISS    3. HTNA/P: 78F s/p laparoscopic sigmoidectomy, BSO, cystoscopy and ureteral stent placement, s/p removal of both stents at end of case.    - clears / IVF  - pain control: hPCA  - OOBA / DVT PPx / IS  - ISS  - restart home atenolol  - restart home budenoside-formeterol  - restart home tiotropium  - restart home simvastatin  - likely home w/o home care services A/P: 78F s/p laparoscopic sigmoidectomy, BSO, cystoscopy and ureteral stent placement, s/p removal of both stents at end of case.    1. s/p surgery  - low res diet  - pain control  - OOBA / DVT PPx / IS    2. DM  - ISS    3. HTN  - home BP meds    4. Dispo

## 2018-03-26 NOTE — PROGRESS NOTE ADULT - SUBJECTIVE AND OBJECTIVE BOX
78F found to have rectal polyp that was suspicious on imaging for a malignancy, now here for sigmoidectomy.    Colon polyp    COPD with asthma    GERD (gastroesophageal reflux disease)    Hyperlipidemia    Hypertension    Spinal stenosis.    Past Surgical History:  History of bilateral hip replacements    History of laminectomy  2013.          	  MEDICATIONS:  ATENolol  Tablet 25 milliGRAM(s) Oral daily      buDESOnide 160 MICROgram(s)/formoterol 4.5 MICROgram(s) Inhaler 2 Puff(s) Inhalation two times a day  tiotropium 18 MICROgram(s) Capsule 1 Capsule(s) Inhalation daily    acetaminophen    Suspension. 650 milliGRAM(s) Oral every 6 hours PRN  metoclopramide Injectable 10 milliGRAM(s) IV Push once PRN  ondansetron Injectable 4 milliGRAM(s) IV Push every 6 hours PRN  oxyCODONE    5 mG/acetaminophen 325 mG 1 Tablet(s) Oral every 6 hours PRN    pantoprazole    Tablet 40 milliGRAM(s) Oral before breakfast    insulin lispro (HumaLOG) corrective regimen sliding scale   SubCutaneous Before meals and at bedtime  simvastatin 20 milliGRAM(s) Oral at bedtime    artificial  tears Solution 1 Drop(s) Both EYES three times a day PRN  heparin  Injectable 5000 Unit(s) SubCutaneous every 8 hours      Complaint:     Otherwise 12 point review of systems is normal.    PHYSICAL EXAM:    PHYSICAL EXAM:      Constitutional: NAD  Eyes: PERRL, EOMI, sclera non-icteric  Neck: supple, no masses, no JVD  Respiratory: CTA b/l, good air entry b/l, no wheezing, rhonchi, rales  Cardiovascular: RRR, normal S1S2, no M/R/G  Gastrointestinal: soft, NTND, +BM  Extremities:  no c/c/e  Neurological: AAOx3          ICU Vital Signs Last 24 Hrs  T(C): 36.7 (26 Mar 2018 09:06), Max: 36.7 (26 Mar 2018 09:06)  T(F): 98 (26 Mar 2018 09:06), Max: 98 (26 Mar 2018 09:06)  HR: 82 (26 Mar 2018 09:06) (82 - 88)  BP: 152/78 (26 Mar 2018 09:06) (152/78 - 171/89)  BP(mean): --  ABP: --  ABP(mean): --  RR: 17 (26 Mar 2018 09:06) (17 - 17)  SpO2: 98% (26 Mar 2018 09:06) (98% - 98%)            ECG:      CHEST X RAY    CT    MRI    MRA    CT ANGIO    CAROTID DUPLEX    DUPLEX     Echocardiogram    Catheterization:    Stress Test:     LABS:	 	  CARDIAC MARKERS:                              12.1   8.2   )-----------( 241      ( 25 Mar 2018 06:40 )             38.6     03-25    144  |  102  |  10  ----------------------------<  118<H>  3.5   |  33<H>  |  0.61    Ca    9.3      25 Mar 2018 06:40  Phos  3.0     03-25  Mg     1.9     03-25      proBNP:   Lipid Profile:   HgA1c: Hemoglobin A1C, Whole Blood: 6.7 % (03-24 @ 02:34)        ASSESSMENT/PLAN: 	    Patient is a 78 yo female found to have a rectal polyp now status post sigmoidectomy, BSO, cystoscopy. Patient's post-operative course was uncomplicated. Diet was advanced as tolerated and pain was well controlled on medication. On day of discharge, pt deemed stable and ready to return home with plan to follow up as an outpatient.

## 2018-03-26 NOTE — PROGRESS NOTE ADULT - SUBJECTIVE AND OBJECTIVE BOX
INTERVAL HPI/OVERNIGHT EVENTS:      Patient is a 77y old  Female who presents with a chief complaint of sigmoid colon resection (03-26-18)   was seen and examined today. Reports the following symptoms:    CONSTITUTIONAL:  Negative fever or chills, feels well, good appetite  EYES:  Negative  blurry vision or double vision  CARDIOVASCULAR:  Negative for chest pain or palpitations  RESPIRATORY:  Negative for cough, wheezing, or SOB   GASTROINTESTINAL:  Negative for nausea, vomiting, diarrhea, constipation, or abdominal pain  GENITOURINARY:  Negative frequency, urgency or dysuria  NEUROLOGIC:  No headache, confusion, dizziness, lightheadedness    MEDICATIONS  (STANDING):  ATENolol  Tablet 25 milliGRAM(s) Oral daily  buDESOnide 160 MICROgram(s)/formoterol 4.5 MICROgram(s) Inhaler 2 Puff(s) Inhalation two times a day  heparin  Injectable 5000 Unit(s) SubCutaneous every 8 hours  insulin lispro (HumaLOG) corrective regimen sliding scale   SubCutaneous Before meals and at bedtime  pantoprazole    Tablet 40 milliGRAM(s) Oral before breakfast  simvastatin 20 milliGRAM(s) Oral at bedtime  tiotropium 18 MICROgram(s) Capsule 1 Capsule(s) Inhalation daily    MEDICATIONS  (PRN):  acetaminophen    Suspension. 650 milliGRAM(s) Oral every 6 hours PRN Moderate Pain (4 - 6)  artificial  tears Solution 1 Drop(s) Both EYES three times a day PRN Dry Eyes  metoclopramide Injectable 10 milliGRAM(s) IV Push once PRN Nausea and/or Vomiting  ondansetron Injectable 4 milliGRAM(s) IV Push every 6 hours PRN Nausea  oxyCODONE    5 mG/acetaminophen 325 mG 1 Tablet(s) Oral every 6 hours PRN Severe Pain (7 - 10)        LABS:                        12.1   8.2   )-----------( 241      ( 25 Mar 2018 06:40 )             38.6     03-25    144  |  102  |  10  ----------------------------<  118<H>  3.5   |  33<H>  |  0.61    Ca    9.3      25 Mar 2018 06:40  Phos  3.0     03-25  Mg     1.9     03-25      Hemoglobin A1C, Whole Blood: 6.7 % (03-24-18 @ 02:34)            RADIOLOGY & ADDITIONAL TESTS:      Vital Signs Last 24 Hrs  T(C): 36.7 (26 Mar 2018 09:06), Max: 36.7 (26 Mar 2018 09:06)  T(F): 98 (26 Mar 2018 09:06), Max: 98 (26 Mar 2018 09:06)  HR: 82 (26 Mar 2018 09:06) (82 - 88)  BP: 152/78 (26 Mar 2018 09:06) (152/78 - 171/89)  BP(mean): --  RR: 17 (26 Mar 2018 09:06) (17 - 17)  SpO2: 98% (26 Mar 2018 09:06) (98% - 98%)    CAPILLARY BLOOD GLUCOSE      PHYSICAL EXAM:  Constitutional: wn/wd in NAD.   HEENT: NCAT, MMM, OP clear, EOMI, , no proptosis or lid retraction  Neck: supple, no acanthosis, no thyromegaly or palpable thyroid nodules   Respiratory: Lungs CTAB.  Cardiovascular: regular rhythm, normal S1 and S2, no audible murmurs, no peripheral edema  GI: soft, + bowel sounds, NT/ND, no masses/HSM appreciated.  Neurology: no tremors, DTR 2+  Skin: no visible rashes/lesions  Psychiatric: AAO x 3, normal affect/mood.        A/P: 77yFemale admitted for (    ). Consulted and being followed for Diabetes Type (  ).       Uncontrolled DM Type (  ) -     Please continue           units lantus AM/PM, premeal Lispro  (  ) units TID, and  Lispro moderate/low dose sliding scale 4 times daily (before meals and bedtime).  Please continue consistent carbohydrate (Diabetic) diet, FS ac & hs. Target  - 150.      Case discussed with attending and primary team      Attending attestation:  I have seen and evaluated the patient at the bedside.   Endocrine Nurse Practitioner/Fellow/Resident’s note reviewed, including vital signs, PE and laboratory results.  Direct personal management and extensive interpretation of the data conducted.   I agree with the Nurse Practitioner/Fellow/Resident’s assessment and recommendations as above. INTERVAL HPI/OVERNIGHT EVENTS:      Patient is a 77y old  Female who presents with a chief complaint of sigmoid colon resection (03-26-18) was seen and examined today. Reports the following symptoms:    CONSTITUTIONAL:  Negative fever or chills, feels well, good appetite  EYES:  Negative  blurry vision or double vision  CARDIOVASCULAR:  Negative for chest pain or palpitations  RESPIRATORY:  Negative for cough, wheezing, or SOB   GASTROINTESTINAL:  Negative for nausea, vomiting, diarrhea, constipation, or abdominal pain  GENITOURINARY:  Negative frequency, urgency or dysuria  NEUROLOGIC:  No headache, confusion, dizziness, lightheadedness    MEDICATIONS  (STANDING):  ATENolol  Tablet 25 milliGRAM(s) Oral daily  buDESOnide 160 MICROgram(s)/formoterol 4.5 MICROgram(s) Inhaler 2 Puff(s) Inhalation two times a day  heparin  Injectable 5000 Unit(s) SubCutaneous every 8 hours  insulin lispro (HumaLOG) corrective regimen sliding scale   SubCutaneous Before meals and at bedtime  pantoprazole    Tablet 40 milliGRAM(s) Oral before breakfast  simvastatin 20 milliGRAM(s) Oral at bedtime  tiotropium 18 MICROgram(s) Capsule 1 Capsule(s) Inhalation daily    MEDICATIONS  (PRN):  acetaminophen    Suspension. 650 milliGRAM(s) Oral every 6 hours PRN Moderate Pain (4 - 6)  artificial  tears Solution 1 Drop(s) Both EYES three times a day PRN Dry Eyes  metoclopramide Injectable 10 milliGRAM(s) IV Push once PRN Nausea and/or Vomiting  ondansetron Injectable 4 milliGRAM(s) IV Push every 6 hours PRN Nausea  oxyCODONE    5 mG/acetaminophen 325 mG 1 Tablet(s) Oral every 6 hours PRN Severe Pain (7 - 10)        LABS:                        12.1   8.2   )-----------( 241      ( 25 Mar 2018 06:40 )             38.6     03-25    144  |  102  |  10  ----------------------------<  118<H>  3.5   |  33<H>  |  0.61    Ca    9.3      25 Mar 2018 06:40  Phos  3.0     03-25  Mg     1.9    A/P 78 yo. female s/p sigmoid colon resection.  Consulted for management of diabetes type 2.  REC:  INPATIENT  Continue Lispro moderate/low dose sliding scale 4 times daily (before meals and bedtime).    Also continue consistent carbohydrate (Diabetic) diet, FS ac & hs. Target  - 150 mg/dl  OUTPATIENT   F/U with internist Dr. Clay.     Case discussed with attending and primary team      Attending attestation:  I have seen and evaluated the patient at the bedside.   Nurse Practitioner/Fellow/Resident’s note reviewed, including vital signs, PE and laboratory results.  Direct personal management and extensive interpretation of the data conducted.   Assessment and recommendations as above.

## 2018-03-26 NOTE — DISCHARGE NOTE ADULT - MEDICATION SUMMARY - MEDICATIONS TO TAKE
I will START or STAY ON the medications listed below when I get home from the hospital:    oxyCODONE-acetaminophen 5 mg-325 mg oral tablet  -- 1 tab(s) by mouth every 6 hours, As Needed -for severe pain MDD:4   -- Caution federal law prohibits the transfer of this drug to any person other  than the person for whom it was prescribed.  May cause drowsiness.  Alcohol may intensify this effect.  Use care when operating dangerous machinery.  This prescription cannot be refilled.  This product contains acetaminophen.  Do not use  with any other product containing acetaminophen to prevent possible liver damage.  Using more of this medication than prescribed may cause serious breathing problems.    -- Indication: For pain not controlled by tylenol    simvastatin 20 mg oral tablet  -- 1 tab(s) by mouth once a day (at bedtime)  -- Indication: For Home med    atenolol 25 mg oral tablet  -- 1 tab(s) by mouth once a day  -- Indication: For Home med    Symbicort 160 mcg-4.5 mcg/inh inhalation aerosol  -- 2 puff(s) inhaled 2 times a day  -- Indication: For Home med    Spiriva 18 mcg inhalation capsule  -- 1 cap(s) inhaled once a day  -- Indication: For Home med    omeprazole 20 mg oral delayed release capsule  -- 1 cap(s) by mouth once a day  -- Indication: For Home med    Vitamin D3 1000 intl units oral capsule  -- 1 cap(s) by mouth once a day  -- Indication: For Home med

## 2018-03-26 NOTE — DISCHARGE NOTE ADULT - CARE PROVIDER_API CALL
Jose Manuel Barrientos), Surgery  [address] [place] [address]  Phone: [phone]  Fax: [phone]

## 2018-03-30 LAB — SURGICAL PATHOLOGY STUDY: SIGNIFICANT CHANGE UP

## 2018-08-15 ENCOUNTER — APPOINTMENT (OUTPATIENT)
Dept: PULMONOLOGY | Facility: CLINIC | Age: 78
End: 2018-08-15
Payer: MEDICARE

## 2018-08-15 VITALS
DIASTOLIC BLOOD PRESSURE: 72 MMHG | BODY MASS INDEX: 27.1 KG/M2 | HEART RATE: 101 BPM | OXYGEN SATURATION: 100 % | TEMPERATURE: 98.8 F | SYSTOLIC BLOOD PRESSURE: 116 MMHG | WEIGHT: 153 LBS

## 2018-08-15 DIAGNOSIS — J44.9 CHRONIC OBSTRUCTIVE PULMONARY DISEASE, UNSPECIFIED: ICD-10-CM

## 2018-08-15 PROBLEM — K63.5 POLYP OF COLON: Chronic | Status: ACTIVE | Noted: 2018-03-21

## 2018-08-15 PROBLEM — K21.9 GASTRO-ESOPHAGEAL REFLUX DISEASE WITHOUT ESOPHAGITIS: Chronic | Status: ACTIVE | Noted: 2018-03-21

## 2018-08-15 PROCEDURE — 94060 EVALUATION OF WHEEZING: CPT

## 2018-08-15 PROCEDURE — 99215 OFFICE O/P EST HI 40 MIN: CPT | Mod: 25

## 2018-08-30 ENCOUNTER — INPATIENT (INPATIENT)
Facility: HOSPITAL | Age: 78
LOS: 3 days | End: 2018-09-03
Attending: INTERNAL MEDICINE | Admitting: HOSPITALIST
Payer: MEDICARE

## 2018-08-30 VITALS
RESPIRATION RATE: 26 BRPM | OXYGEN SATURATION: 88 % | HEART RATE: 145 BPM | DIASTOLIC BLOOD PRESSURE: 110 MMHG | SYSTOLIC BLOOD PRESSURE: 204 MMHG

## 2018-08-30 DIAGNOSIS — Z98.890 OTHER SPECIFIED POSTPROCEDURAL STATES: Chronic | ICD-10-CM

## 2018-08-30 LAB
ALBUMIN SERPL ELPH-MCNC: 3.4 G/DL — SIGNIFICANT CHANGE UP (ref 3.3–5)
ALP SERPL-CCNC: 126 U/L — HIGH (ref 40–120)
ALT FLD-CCNC: 44 U/L — HIGH (ref 4–33)
AST SERPL-CCNC: 86 U/L — HIGH (ref 4–32)
BASE EXCESS BLDV CALC-SCNC: -2.9 MMOL/L — SIGNIFICANT CHANGE UP
BASOPHILS # BLD AUTO: 0.01 K/UL — SIGNIFICANT CHANGE UP (ref 0–0.2)
BASOPHILS NFR BLD AUTO: 0.1 % — SIGNIFICANT CHANGE UP (ref 0–2)
BILIRUB SERPL-MCNC: 1.1 MG/DL — SIGNIFICANT CHANGE UP (ref 0.2–1.2)
BLOOD GAS VENOUS - CREATININE: 0.54 MG/DL — SIGNIFICANT CHANGE UP (ref 0.5–1.3)
BUN SERPL-MCNC: 15 MG/DL — SIGNIFICANT CHANGE UP (ref 7–23)
CALCIUM SERPL-MCNC: 8.7 MG/DL — SIGNIFICANT CHANGE UP (ref 8.4–10.5)
CHLORIDE BLDV-SCNC: 103 MMOL/L — SIGNIFICANT CHANGE UP (ref 96–108)
CHLORIDE SERPL-SCNC: 95 MMOL/L — LOW (ref 98–107)
CO2 SERPL-SCNC: 19 MMOL/L — LOW (ref 22–31)
CREAT SERPL-MCNC: 0.63 MG/DL — SIGNIFICANT CHANGE UP (ref 0.5–1.3)
EOSINOPHIL # BLD AUTO: 0 K/UL — SIGNIFICANT CHANGE UP (ref 0–0.5)
EOSINOPHIL NFR BLD AUTO: 0 % — SIGNIFICANT CHANGE UP (ref 0–6)
GAS PNL BLDV: 131 MMOL/L — LOW (ref 136–146)
GLUCOSE BLDV-MCNC: 272 — HIGH (ref 70–99)
GLUCOSE SERPL-MCNC: 263 MG/DL — HIGH (ref 70–99)
HCO3 BLDV-SCNC: 21 MMOL/L — SIGNIFICANT CHANGE UP (ref 20–27)
HCT VFR BLD CALC: 33.4 % — LOW (ref 34.5–45)
HCT VFR BLDV CALC: 37 % — SIGNIFICANT CHANGE UP (ref 34.5–45)
HGB BLD-MCNC: 11 G/DL — LOW (ref 11.5–15.5)
HGB BLDV-MCNC: 12 G/DL — SIGNIFICANT CHANGE UP (ref 11.5–15.5)
IMM GRANULOCYTES # BLD AUTO: 0.12 # — SIGNIFICANT CHANGE UP
IMM GRANULOCYTES NFR BLD AUTO: 0.9 % — SIGNIFICANT CHANGE UP (ref 0–1.5)
LACTATE BLDV-MCNC: 3.7 MMOL/L — HIGH (ref 0.5–2)
LYMPHOCYTES # BLD AUTO: 15 % — SIGNIFICANT CHANGE UP (ref 13–44)
LYMPHOCYTES # BLD AUTO: 2.03 K/UL — SIGNIFICANT CHANGE UP (ref 1–3.3)
MCHC RBC-ENTMCNC: 32.9 % — SIGNIFICANT CHANGE UP (ref 32–36)
MCHC RBC-ENTMCNC: 33 PG — SIGNIFICANT CHANGE UP (ref 27–34)
MCV RBC AUTO: 100.3 FL — HIGH (ref 80–100)
MONOCYTES # BLD AUTO: 0.93 K/UL — HIGH (ref 0–0.9)
MONOCYTES NFR BLD AUTO: 6.9 % — SIGNIFICANT CHANGE UP (ref 2–14)
NEUTROPHILS # BLD AUTO: 10.46 K/UL — HIGH (ref 1.8–7.4)
NEUTROPHILS NFR BLD AUTO: 77.1 % — HIGH (ref 43–77)
NRBC # FLD: 0.02 — SIGNIFICANT CHANGE UP
PCO2 BLDV: 47 MMHG — SIGNIFICANT CHANGE UP (ref 41–51)
PH BLDV: 7.3 PH — LOW (ref 7.32–7.43)
PLATELET # BLD AUTO: 120 K/UL — LOW (ref 150–400)
PMV BLD: 11.2 FL — SIGNIFICANT CHANGE UP (ref 7–13)
PO2 BLDV: 48 MMHG — HIGH (ref 35–40)
POTASSIUM BLDV-SCNC: 3.7 MMOL/L — SIGNIFICANT CHANGE UP (ref 3.4–4.5)
POTASSIUM SERPL-MCNC: 4.4 MMOL/L — SIGNIFICANT CHANGE UP (ref 3.5–5.3)
POTASSIUM SERPL-SCNC: 4.4 MMOL/L — SIGNIFICANT CHANGE UP (ref 3.5–5.3)
PROT SERPL-MCNC: 6.7 G/DL — SIGNIFICANT CHANGE UP (ref 6–8.3)
RBC # BLD: 3.33 M/UL — LOW (ref 3.8–5.2)
RBC # FLD: 20.4 % — HIGH (ref 10.3–14.5)
SAO2 % BLDV: 77.9 % — SIGNIFICANT CHANGE UP (ref 60–85)
SODIUM SERPL-SCNC: 132 MMOL/L — LOW (ref 135–145)
WBC # BLD: 13.55 K/UL — HIGH (ref 3.8–10.5)
WBC # FLD AUTO: 13.55 K/UL — HIGH (ref 3.8–10.5)

## 2018-08-30 PROCEDURE — 71045 X-RAY EXAM CHEST 1 VIEW: CPT | Mod: 26

## 2018-08-30 RX ORDER — IPRATROPIUM/ALBUTEROL SULFATE 18-103MCG
3 AEROSOL WITH ADAPTER (GRAM) INHALATION ONCE
Qty: 0 | Refills: 0 | Status: COMPLETED | OUTPATIENT
Start: 2018-08-30 | End: 2018-08-30

## 2018-08-30 RX ORDER — MAGNESIUM SULFATE 500 MG/ML
2 VIAL (ML) INJECTION ONCE
Qty: 0 | Refills: 0 | Status: COMPLETED | OUTPATIENT
Start: 2018-08-30 | End: 2018-08-30

## 2018-08-30 RX ORDER — SODIUM CHLORIDE 9 MG/ML
1000 INJECTION INTRAMUSCULAR; INTRAVENOUS; SUBCUTANEOUS ONCE
Qty: 0 | Refills: 0 | Status: COMPLETED | OUTPATIENT
Start: 2018-08-30 | End: 2018-08-30

## 2018-08-30 RX ADMIN — Medication 3 MILLILITER(S): at 23:19

## 2018-08-30 RX ADMIN — Medication 125 MILLIGRAM(S): at 23:08

## 2018-08-30 RX ADMIN — Medication 50 GRAM(S): at 23:08

## 2018-08-30 RX ADMIN — SODIUM CHLORIDE 1000 MILLILITER(S): 9 INJECTION INTRAMUSCULAR; INTRAVENOUS; SUBCUTANEOUS at 23:09

## 2018-08-30 RX ADMIN — Medication 3 MILLILITER(S): at 23:18

## 2018-08-30 NOTE — ED ADULT NURSE NOTE - NSIMPLEMENTINTERV_GEN_ALL_ED
Implemented All Fall Risk Interventions:  [place] to call system. Call bell, personal items and telephone within reach. Instruct patient to call for assistance. Room bathroom lighting operational. Non-slip footwear when patient is off stretcher. Physically safe environment: no spills, clutter or unnecessary equipment. Stretcher in lowest position, wheels locked, appropriate side rails in place. Provide visual cue, wrist band, yellow gown, etc. Monitor gait and stability. Monitor for mental status changes and reorient to person, place, and time. Review medications for side effects contributing to fall risk. Reinforce activity limits and safety measures with patient and family.

## 2018-08-30 NOTE — ED PROVIDER NOTE - OBJECTIVE STATEMENT
78F hx copd p/w sob. Patient reports increased cough, fever, difficulty breathing for the past two days. Went to see pmd yesterday and was given 20mg prednisone, duoneb, amoxicillin and d/c'd home. Patient returns with worsening cough with sputum production and increased work of breathing today. Was febrile to 101.3 on tuesday. Denies cp, back pain, abd pain, urinary symptoms, weakness, neurological complaints, lower extremity swelling, recent travel, no other complaints. 78F hx copd p/w sob. Patient reports increased cough, fever, difficulty breathing for the past two days. Went to see pmd yesterday and was given 20mg prednisone, duoneb, amoxicillin and d/c'd home. Patient returns with worsening cough with sputum production and increased work of breathing today. Was febrile to 101.3 on tuesday. Denies cp, back pain, abd pain, urinary symptoms, weakness, neurological complaints, lower extremity swelling, recent travel, no other complaints.  Attending - Agree with above.  I evaluated patient myself.  77 y/o F with daughter at bedside.  Reports hx of COPD and asthma.  Having increasing shortness of breathe and wheezing since Monday despite home medications.  To MD yesterday and started steroid and amoxicillin.  Denies chest pain.  No recent trauma.  Noted hx colon CA on chemo.  Has never used CPAP before.

## 2018-08-30 NOTE — ED PROVIDER NOTE - PHYSICAL EXAMINATION
ATTENDING PHYSICAL EXAM DR. Solis ***GEN - Sitting up on stretcher, able to speak full sentences although pressured speech, RR 20-22, O2 97%RA, A+O x3 ***HEAD - NC/AT ***EYES/NOSE - PERRL, EOMI, mucous membranes moist, no discharge ***THROAT: Oral cavity and pharynx normal. No inflammation, swelling, exudate, or lesions.  ***NECK: Neck supple, non-tender without lymphadenopathy, no masses, no JVD.   ***PULMONARY - slight supraclavicular retractions appreciated with decreased a/e and wheezes throughout b/l lung fields ***CARDIAC -s1s2, tachy, 3/6 sys murmur  ***ABDOMEN - +BS, ND, NT, soft, no guarding, no rebound, no masses   ***BACK - no CVA tenderness, Normal  spine ***EXTREMITIES - symmetric pulses, 2+ dp, capillary refill < 2 seconds, no clubbing, no cyanosis, no edema ***SKIN - no rash or bruising   ***NEUROLOGIC - alert, CN 2-12 intact

## 2018-08-30 NOTE — ED ADULT NURSE NOTE - OBJECTIVE STATEMENT
Shell RN: Patient is a 79 y/o female a&ox4 with a hx of colon CA, last chemo last Monday, p/w a c/c of non-productive cough and fever x2 days.  Patient reports seeking care at her PMD who gave her prednisone and abx, advised she seek CXR.  Upon presentation, patient noted to be tachypneic with labored breathing, tachycardic to 140.  Dr. Mcmanus advised, respiratory paged, patient presently being placed on bi-pap.  Patient present with BL wheezes in all fields.  Rectally a-febrile, rectal temp witnessed by Kamini PCA.  18 gauge PIV placed in left ac.  VS as noted, Dr. Mcmanus as bedside.  Patient endorsed to primary RN Raquel.

## 2018-08-30 NOTE — ED ADULT TRIAGE NOTE - CHIEF COMPLAINT QUOTE
c/o sob for past few days, not relieved with nebulizer treatments. O2 88% on room air. hx copd. charge RN notified. brought directly to room 20.

## 2018-08-30 NOTE — ED PROVIDER NOTE - RESPIRATORY, MLM
Breath sounds clear and equal bilaterally. +accessory muscle use, coughing during exam. Moving air poorly with inspiratory and expiratory wheezes

## 2018-08-30 NOTE — ED PROVIDER NOTE - CARDIAC, MLM
tachycaridc, regular rhythm.  Heart sounds S1, S2.  No murmurs, rubs or gallops. 2+ pulses in distal extremities b/l

## 2018-08-30 NOTE — ED PROVIDER NOTE - MEDICAL DECISION MAKING DETAILS
78F hx of COPD, htn, hld, p/w sob. Likely COPD exacerbation, Will give duonebs, steroids, mag, CXR, bipap. a/m

## 2018-08-30 NOTE — ED PROVIDER NOTE - CONSTITUTIONAL, MLM
normal... Appears in mild-moderate distress. Speaking in short sentences. Otherwise well nourished, awake, alert, oriented to person, place, time/situation. Able to walk to bed unassisted

## 2018-08-31 DIAGNOSIS — J44.1 CHRONIC OBSTRUCTIVE PULMONARY DISEASE WITH (ACUTE) EXACERBATION: ICD-10-CM

## 2018-08-31 LAB
ALBUMIN SERPL ELPH-MCNC: 2.9 G/DL — LOW (ref 3.3–5)
ALP SERPL-CCNC: 108 U/L — SIGNIFICANT CHANGE UP (ref 40–120)
ALT FLD-CCNC: 40 U/L — HIGH (ref 4–33)
AST SERPL-CCNC: 77 U/L — HIGH (ref 4–32)
B PERT DNA SPEC QL NAA+PROBE: SIGNIFICANT CHANGE UP
BASE EXCESS BLDA CALC-SCNC: -3.7 MMOL/L — SIGNIFICANT CHANGE UP
BASE EXCESS BLDA CALC-SCNC: -5.1 MMOL/L — SIGNIFICANT CHANGE UP
BASE EXCESS BLDA CALC-SCNC: -6.8 MMOL/L — SIGNIFICANT CHANGE UP
BASE EXCESS BLDV CALC-SCNC: -3 MMOL/L — SIGNIFICANT CHANGE UP
BASOPHILS # BLD AUTO: 0.01 K/UL — SIGNIFICANT CHANGE UP (ref 0–0.2)
BASOPHILS NFR BLD AUTO: 0.1 % — SIGNIFICANT CHANGE UP (ref 0–2)
BILIRUB SERPL-MCNC: 0.9 MG/DL — SIGNIFICANT CHANGE UP (ref 0.2–1.2)
BLOOD GAS VENOUS - CREATININE: 0.59 MG/DL — SIGNIFICANT CHANGE UP (ref 0.5–1.3)
BUN SERPL-MCNC: 18 MG/DL — SIGNIFICANT CHANGE UP (ref 7–23)
C PNEUM DNA SPEC QL NAA+PROBE: NOT DETECTED — SIGNIFICANT CHANGE UP
CA-I BLDA-SCNC: 1.15 MMOL/L — SIGNIFICANT CHANGE UP (ref 1.15–1.29)
CALCIUM SERPL-MCNC: 8.1 MG/DL — LOW (ref 8.4–10.5)
CHLORIDE BLDA-SCNC: 110 MMOL/L — HIGH (ref 96–108)
CHLORIDE BLDV-SCNC: 107 MMOL/L — SIGNIFICANT CHANGE UP (ref 96–108)
CHLORIDE SERPL-SCNC: 103 MMOL/L — SIGNIFICANT CHANGE UP (ref 98–107)
CK MB BLD-MCNC: 16.72 NG/ML — HIGH (ref 1–4.7)
CK MB BLD-MCNC: 20 NG/ML — HIGH (ref 1–4.7)
CK MB BLD-MCNC: 23.93 NG/ML — HIGH (ref 1–4.7)
CK MB BLD-MCNC: 4.5 — HIGH (ref 0–2.5)
CK MB BLD-MCNC: 5.4 — HIGH (ref 0–2.5)
CK MB BLD-MCNC: 8.1 — HIGH (ref 0–2.5)
CK SERPL-CCNC: 295 U/L — HIGH (ref 25–170)
CK SERPL-CCNC: 372 U/L — HIGH (ref 25–170)
CK SERPL-CCNC: 372 U/L — HIGH (ref 25–170)
CO2 SERPL-SCNC: 19 MMOL/L — LOW (ref 22–31)
CREAT BLDA-MCNC: 0.83 MG/DL — SIGNIFICANT CHANGE UP (ref 0.5–1.3)
CREAT SERPL-MCNC: 0.81 MG/DL — SIGNIFICANT CHANGE UP (ref 0.5–1.3)
EOSINOPHIL # BLD AUTO: 0 K/UL — SIGNIFICANT CHANGE UP (ref 0–0.5)
EOSINOPHIL NFR BLD AUTO: 0 % — SIGNIFICANT CHANGE UP (ref 0–6)
FLUAV H1 2009 PAND RNA SPEC QL NAA+PROBE: NOT DETECTED — SIGNIFICANT CHANGE UP
FLUAV H1 RNA SPEC QL NAA+PROBE: NOT DETECTED — SIGNIFICANT CHANGE UP
FLUAV H3 RNA SPEC QL NAA+PROBE: NOT DETECTED — SIGNIFICANT CHANGE UP
FLUAV SUBTYP SPEC NAA+PROBE: SIGNIFICANT CHANGE UP
FLUBV RNA SPEC QL NAA+PROBE: NOT DETECTED — SIGNIFICANT CHANGE UP
GAS PNL BLDV: 135 MMOL/L — LOW (ref 136–146)
GLUCOSE BLDA-MCNC: 357 MG/DL — HIGH (ref 70–99)
GLUCOSE BLDA-MCNC: 371 MG/DL — HIGH (ref 70–99)
GLUCOSE BLDA-MCNC: 388 MG/DL — HIGH (ref 70–99)
GLUCOSE BLDV-MCNC: 270 — HIGH (ref 70–99)
GLUCOSE SERPL-MCNC: 374 MG/DL — HIGH (ref 70–99)
HADV DNA SPEC QL NAA+PROBE: NOT DETECTED — SIGNIFICANT CHANGE UP
HCO3 BLDA-SCNC: 18 MMOL/L — LOW (ref 22–26)
HCO3 BLDA-SCNC: 20 MMOL/L — LOW (ref 22–26)
HCO3 BLDA-SCNC: 21 MMOL/L — LOW (ref 22–26)
HCO3 BLDV-SCNC: 21 MMOL/L — SIGNIFICANT CHANGE UP (ref 20–27)
HCOV 229E RNA SPEC QL NAA+PROBE: NOT DETECTED — SIGNIFICANT CHANGE UP
HCOV HKU1 RNA SPEC QL NAA+PROBE: NOT DETECTED — SIGNIFICANT CHANGE UP
HCOV NL63 RNA SPEC QL NAA+PROBE: NOT DETECTED — SIGNIFICANT CHANGE UP
HCOV OC43 RNA SPEC QL NAA+PROBE: NOT DETECTED — SIGNIFICANT CHANGE UP
HCT VFR BLD CALC: 30.4 % — LOW (ref 34.5–45)
HCT VFR BLDA CALC: 30.4 % — LOW (ref 34.5–46.5)
HCT VFR BLDA CALC: 32 % — LOW (ref 34.5–46.5)
HCT VFR BLDA CALC: 32.2 % — LOW (ref 34.5–46.5)
HCT VFR BLDV CALC: 30.7 % — LOW (ref 34.5–45)
HGB BLD-MCNC: 10 G/DL — LOW (ref 11.5–15.5)
HGB BLDA-MCNC: 10.4 G/DL — LOW (ref 11.5–15.5)
HGB BLDA-MCNC: 10.4 G/DL — LOW (ref 11.5–15.5)
HGB BLDA-MCNC: 9.8 G/DL — LOW (ref 11.5–15.5)
HGB BLDV-MCNC: 9.9 G/DL — LOW (ref 11.5–15.5)
HMPV RNA SPEC QL NAA+PROBE: POSITIVE — HIGH
HPIV1 RNA SPEC QL NAA+PROBE: NOT DETECTED — SIGNIFICANT CHANGE UP
HPIV2 RNA SPEC QL NAA+PROBE: NOT DETECTED — SIGNIFICANT CHANGE UP
HPIV3 RNA SPEC QL NAA+PROBE: NOT DETECTED — SIGNIFICANT CHANGE UP
HPIV4 RNA SPEC QL NAA+PROBE: NOT DETECTED — SIGNIFICANT CHANGE UP
IMM GRANULOCYTES # BLD AUTO: 0.13 # — SIGNIFICANT CHANGE UP
IMM GRANULOCYTES NFR BLD AUTO: 1.1 % — SIGNIFICANT CHANGE UP (ref 0–1.5)
LACTATE BLDA-SCNC: 1.8 MMOL/L — SIGNIFICANT CHANGE UP (ref 0.5–2)
LACTATE BLDA-SCNC: 3.3 MMOL/L — HIGH (ref 0.5–2)
LACTATE BLDV-MCNC: 2.4 MMOL/L — HIGH (ref 0.5–2)
LYMPHOCYTES # BLD AUTO: 1.39 K/UL — SIGNIFICANT CHANGE UP (ref 1–3.3)
LYMPHOCYTES # BLD AUTO: 11.3 % — LOW (ref 13–44)
M PNEUMO DNA SPEC QL NAA+PROBE: NOT DETECTED — SIGNIFICANT CHANGE UP
MAGNESIUM SERPL-MCNC: 2.3 MG/DL — SIGNIFICANT CHANGE UP (ref 1.6–2.6)
MCHC RBC-ENTMCNC: 32.9 % — SIGNIFICANT CHANGE UP (ref 32–36)
MCHC RBC-ENTMCNC: 33.6 PG — SIGNIFICANT CHANGE UP (ref 27–34)
MCV RBC AUTO: 102 FL — HIGH (ref 80–100)
MONOCYTES # BLD AUTO: 0.95 K/UL — HIGH (ref 0–0.9)
MONOCYTES NFR BLD AUTO: 7.7 % — SIGNIFICANT CHANGE UP (ref 2–14)
NEUTROPHILS # BLD AUTO: 9.82 K/UL — HIGH (ref 1.8–7.4)
NEUTROPHILS NFR BLD AUTO: 79.8 % — HIGH (ref 43–77)
NRBC # FLD: 0.08 — SIGNIFICANT CHANGE UP
PCO2 BLDA: 43 MMHG — SIGNIFICANT CHANGE UP (ref 32–48)
PCO2 BLDA: 45 MMHG — SIGNIFICANT CHANGE UP (ref 32–48)
PCO2 BLDA: 51 MMHG — HIGH (ref 32–48)
PCO2 BLDV: 45 MMHG — SIGNIFICANT CHANGE UP (ref 41–51)
PH BLDA: 7.21 PH — LOW (ref 7.35–7.45)
PH BLDA: 7.29 PH — LOW (ref 7.35–7.45)
PH BLDA: 7.3 PH — LOW (ref 7.35–7.45)
PH BLDV: 7.32 PH — SIGNIFICANT CHANGE UP (ref 7.32–7.43)
PHOSPHATE SERPL-MCNC: 4.4 MG/DL — SIGNIFICANT CHANGE UP (ref 2.5–4.5)
PLATELET # BLD AUTO: 123 K/UL — LOW (ref 150–400)
PMV BLD: 11.2 FL — SIGNIFICANT CHANGE UP (ref 7–13)
PO2 BLDA: 151 MMHG — HIGH (ref 83–108)
PO2 BLDA: 164 MMHG — HIGH (ref 83–108)
PO2 BLDA: 167 MMHG — HIGH (ref 83–108)
PO2 BLDV: 36 MMHG — SIGNIFICANT CHANGE UP (ref 35–40)
POTASSIUM BLDA-SCNC: 3.7 MMOL/L — SIGNIFICANT CHANGE UP (ref 3.4–4.5)
POTASSIUM BLDA-SCNC: 4 MMOL/L — SIGNIFICANT CHANGE UP (ref 3.4–4.5)
POTASSIUM BLDA-SCNC: 4.1 MMOL/L — SIGNIFICANT CHANGE UP (ref 3.4–4.5)
POTASSIUM BLDV-SCNC: 3.7 MMOL/L — SIGNIFICANT CHANGE UP (ref 3.4–4.5)
POTASSIUM SERPL-MCNC: 4.5 MMOL/L — SIGNIFICANT CHANGE UP (ref 3.5–5.3)
POTASSIUM SERPL-SCNC: 4.5 MMOL/L — SIGNIFICANT CHANGE UP (ref 3.5–5.3)
PROT SERPL-MCNC: 5.6 G/DL — LOW (ref 6–8.3)
RBC # BLD: 2.98 M/UL — LOW (ref 3.8–5.2)
RBC # FLD: 20.6 % — HIGH (ref 10.3–14.5)
RSV RNA SPEC QL NAA+PROBE: NOT DETECTED — SIGNIFICANT CHANGE UP
RV+EV RNA SPEC QL NAA+PROBE: NOT DETECTED — SIGNIFICANT CHANGE UP
SAO2 % BLDA: 98.2 % — SIGNIFICANT CHANGE UP (ref 95–99)
SAO2 % BLDA: 98.9 % — SIGNIFICANT CHANGE UP (ref 95–99)
SAO2 % BLDA: 98.9 % — SIGNIFICANT CHANGE UP (ref 95–99)
SAO2 % BLDV: 60.9 % — SIGNIFICANT CHANGE UP (ref 60–85)
SODIUM BLDA-SCNC: 133 MMOL/L — LOW (ref 136–146)
SODIUM BLDA-SCNC: 133 MMOL/L — LOW (ref 136–146)
SODIUM BLDA-SCNC: 136 MMOL/L — SIGNIFICANT CHANGE UP (ref 136–146)
SODIUM SERPL-SCNC: 135 MMOL/L — SIGNIFICANT CHANGE UP (ref 135–145)
SPECIMEN SOURCE: SIGNIFICANT CHANGE UP
SPECIMEN SOURCE: SIGNIFICANT CHANGE UP
TROPONIN T, HIGH SENSITIVITY: 804 NG/L — CRITICAL HIGH (ref ?–14)
TROPONIN T, HIGH SENSITIVITY: 899 NG/L — CRITICAL HIGH (ref ?–14)
TROPONIN T, HIGH SENSITIVITY: 920 NG/L — CRITICAL HIGH (ref ?–14)
WBC # BLD: 12.3 K/UL — HIGH (ref 3.8–10.5)
WBC # FLD AUTO: 12.3 K/UL — HIGH (ref 3.8–10.5)

## 2018-08-31 PROCEDURE — 71045 X-RAY EXAM CHEST 1 VIEW: CPT | Mod: 26

## 2018-08-31 PROCEDURE — 36010 PLACE CATHETER IN VEIN: CPT

## 2018-08-31 PROCEDURE — 93306 TTE W/DOPPLER COMPLETE: CPT | Mod: 26

## 2018-08-31 PROCEDURE — 31500 INSERT EMERGENCY AIRWAY: CPT

## 2018-08-31 PROCEDURE — 99291 CRITICAL CARE FIRST HOUR: CPT | Mod: 25

## 2018-08-31 PROCEDURE — 43753 TX GASTRO INTUB W/ASP: CPT

## 2018-08-31 PROCEDURE — 36620 INSERTION CATHETER ARTERY: CPT | Mod: GC,59

## 2018-08-31 PROCEDURE — 76604 US EXAM CHEST: CPT | Mod: 26,GC,59

## 2018-08-31 PROCEDURE — 93308 TTE F-UP OR LMTD: CPT | Mod: 26,GC,59

## 2018-08-31 RX ORDER — GLUCAGON INJECTION, SOLUTION 0.5 MG/.1ML
1 INJECTION, SOLUTION SUBCUTANEOUS ONCE
Qty: 0 | Refills: 0 | Status: DISCONTINUED | OUTPATIENT
Start: 2018-08-31 | End: 2018-09-03

## 2018-08-31 RX ORDER — MIDAZOLAM HYDROCHLORIDE 1 MG/ML
8 INJECTION, SOLUTION INTRAMUSCULAR; INTRAVENOUS ONCE
Qty: 0 | Refills: 0 | Status: DISCONTINUED | OUTPATIENT
Start: 2018-08-31 | End: 2018-08-31

## 2018-08-31 RX ORDER — SODIUM CHLORIDE 9 MG/ML
1000 INJECTION, SOLUTION INTRAVENOUS
Qty: 0 | Refills: 0 | Status: DISCONTINUED | OUTPATIENT
Start: 2018-08-31 | End: 2018-09-03

## 2018-08-31 RX ORDER — IPRATROPIUM/ALBUTEROL SULFATE 18-103MCG
3 AEROSOL WITH ADAPTER (GRAM) INHALATION EVERY 4 HOURS
Qty: 0 | Refills: 0 | Status: DISCONTINUED | OUTPATIENT
Start: 2018-08-31 | End: 2018-08-31

## 2018-08-31 RX ORDER — PROPOFOL 10 MG/ML
50 INJECTION, EMULSION INTRAVENOUS
Qty: 1000 | Refills: 0 | Status: DISCONTINUED | OUTPATIENT
Start: 2018-08-31 | End: 2018-09-03

## 2018-08-31 RX ORDER — CHLORHEXIDINE GLUCONATE 213 G/1000ML
1 SOLUTION TOPICAL
Qty: 0 | Refills: 0 | Status: DISCONTINUED | OUTPATIENT
Start: 2018-08-31 | End: 2018-09-03

## 2018-08-31 RX ORDER — SODIUM CHLORIDE 9 MG/ML
1000 INJECTION INTRAMUSCULAR; INTRAVENOUS; SUBCUTANEOUS ONCE
Qty: 0 | Refills: 0 | Status: COMPLETED | OUTPATIENT
Start: 2018-08-31 | End: 2018-08-31

## 2018-08-31 RX ORDER — DEXTROSE 50 % IN WATER 50 %
25 SYRINGE (ML) INTRAVENOUS ONCE
Qty: 0 | Refills: 0 | Status: DISCONTINUED | OUTPATIENT
Start: 2018-08-31 | End: 2018-09-03

## 2018-08-31 RX ORDER — METOPROLOL TARTRATE 50 MG
5 TABLET ORAL ONCE
Qty: 0 | Refills: 0 | Status: COMPLETED | OUTPATIENT
Start: 2018-08-31 | End: 2018-08-31

## 2018-08-31 RX ORDER — IPRATROPIUM BROMIDE 0.2 MG/ML
1 SOLUTION, NON-ORAL INHALATION EVERY 6 HOURS
Qty: 0 | Refills: 0 | Status: DISCONTINUED | OUTPATIENT
Start: 2018-08-31 | End: 2018-09-03

## 2018-08-31 RX ORDER — ALBUTEROL 90 UG/1
2 AEROSOL, METERED ORAL EVERY 4 HOURS
Qty: 0 | Refills: 0 | Status: DISCONTINUED | OUTPATIENT
Start: 2018-08-31 | End: 2018-09-03

## 2018-08-31 RX ORDER — NOREPINEPHRINE BITARTRATE/D5W 8 MG/250ML
0.05 PLASTIC BAG, INJECTION (ML) INTRAVENOUS
Qty: 16 | Refills: 0 | Status: DISCONTINUED | OUTPATIENT
Start: 2018-08-31 | End: 2018-09-01

## 2018-08-31 RX ORDER — MIDAZOLAM HYDROCHLORIDE 1 MG/ML
4 INJECTION, SOLUTION INTRAMUSCULAR; INTRAVENOUS ONCE
Qty: 0 | Refills: 0 | Status: DISCONTINUED | OUTPATIENT
Start: 2018-08-31 | End: 2018-08-31

## 2018-08-31 RX ORDER — FENTANYL CITRATE 50 UG/ML
100 INJECTION INTRAVENOUS ONCE
Qty: 0 | Refills: 0 | Status: DISCONTINUED | OUTPATIENT
Start: 2018-08-31 | End: 2018-08-31

## 2018-08-31 RX ORDER — IPRATROPIUM/ALBUTEROL SULFATE 18-103MCG
3 AEROSOL WITH ADAPTER (GRAM) INHALATION EVERY 6 HOURS
Qty: 0 | Refills: 0 | Status: DISCONTINUED | OUTPATIENT
Start: 2018-08-31 | End: 2018-08-31

## 2018-08-31 RX ORDER — DEXTROSE 50 % IN WATER 50 %
15 SYRINGE (ML) INTRAVENOUS ONCE
Qty: 0 | Refills: 0 | Status: DISCONTINUED | OUTPATIENT
Start: 2018-08-31 | End: 2018-09-03

## 2018-08-31 RX ORDER — CISATRACURIUM BESYLATE 2 MG/ML
3 INJECTION INTRAVENOUS
Qty: 200 | Refills: 0 | Status: DISCONTINUED | OUTPATIENT
Start: 2018-08-31 | End: 2018-09-01

## 2018-08-31 RX ORDER — CISATRACURIUM BESYLATE 2 MG/ML
20 INJECTION INTRAVENOUS ONCE
Qty: 0 | Refills: 0 | Status: COMPLETED | OUTPATIENT
Start: 2018-08-31 | End: 2018-08-31

## 2018-08-31 RX ORDER — IPRATROPIUM BROMIDE 0.2 MG/ML
1 SOLUTION, NON-ORAL INHALATION ONCE
Qty: 0 | Refills: 0 | Status: COMPLETED | OUTPATIENT
Start: 2018-08-31 | End: 2018-08-31

## 2018-08-31 RX ORDER — IPRATROPIUM/ALBUTEROL SULFATE 18-103MCG
3 AEROSOL WITH ADAPTER (GRAM) INHALATION
Qty: 0 | Refills: 0 | Status: DISCONTINUED | OUTPATIENT
Start: 2018-08-31 | End: 2018-08-31

## 2018-08-31 RX ORDER — ENOXAPARIN SODIUM 100 MG/ML
40 INJECTION SUBCUTANEOUS EVERY 24 HOURS
Qty: 0 | Refills: 0 | Status: DISCONTINUED | OUTPATIENT
Start: 2018-08-31 | End: 2018-09-03

## 2018-08-31 RX ORDER — NOREPINEPHRINE BITARTRATE/D5W 8 MG/250ML
0.05 PLASTIC BAG, INJECTION (ML) INTRAVENOUS
Qty: 8 | Refills: 0 | Status: DISCONTINUED | OUTPATIENT
Start: 2018-08-31 | End: 2018-08-31

## 2018-08-31 RX ORDER — INSULIN LISPRO 100/ML
VIAL (ML) SUBCUTANEOUS AT BEDTIME
Qty: 0 | Refills: 0 | Status: DISCONTINUED | OUTPATIENT
Start: 2018-08-31 | End: 2018-08-31

## 2018-08-31 RX ORDER — INSULIN LISPRO 100/ML
VIAL (ML) SUBCUTANEOUS
Qty: 0 | Refills: 0 | Status: DISCONTINUED | OUTPATIENT
Start: 2018-08-31 | End: 2018-08-31

## 2018-08-31 RX ORDER — DEXTROSE 50 % IN WATER 50 %
12.5 SYRINGE (ML) INTRAVENOUS ONCE
Qty: 0 | Refills: 0 | Status: DISCONTINUED | OUTPATIENT
Start: 2018-08-31 | End: 2018-09-03

## 2018-08-31 RX ORDER — ALBUTEROL 90 UG/1
2 AEROSOL, METERED ORAL
Qty: 0 | Refills: 0 | Status: COMPLETED | OUTPATIENT
Start: 2018-08-31 | End: 2018-08-31

## 2018-08-31 RX ORDER — PROPOFOL 10 MG/ML
30 INJECTION, EMULSION INTRAVENOUS ONCE
Qty: 0 | Refills: 0 | Status: COMPLETED | OUTPATIENT
Start: 2018-08-31 | End: 2018-08-31

## 2018-08-31 RX ORDER — ALBUTEROL 90 UG/1
2.5 AEROSOL, METERED ORAL ONCE
Qty: 0 | Refills: 0 | Status: COMPLETED | OUTPATIENT
Start: 2018-08-31 | End: 2018-08-31

## 2018-08-31 RX ORDER — IPRATROPIUM BROMIDE 0.2 MG/ML
1 SOLUTION, NON-ORAL INHALATION ONCE
Qty: 0 | Refills: 0 | Status: COMPLETED | OUTPATIENT
Start: 2018-08-31 | End: 2018-09-01

## 2018-08-31 RX ORDER — INSULIN LISPRO 100/ML
VIAL (ML) SUBCUTANEOUS EVERY 6 HOURS
Qty: 0 | Refills: 0 | Status: DISCONTINUED | OUTPATIENT
Start: 2018-08-31 | End: 2018-09-03

## 2018-08-31 RX ADMIN — CISATRACURIUM BESYLATE 20 MILLIGRAM(S): 2 INJECTION INTRAVENOUS at 07:20

## 2018-08-31 RX ADMIN — Medication 8: at 18:51

## 2018-08-31 RX ADMIN — PROPOFOL 21 MICROGRAM(S)/KG/MIN: 10 INJECTION, EMULSION INTRAVENOUS at 07:03

## 2018-08-31 RX ADMIN — ALBUTEROL 2 PUFF(S): 90 AEROSOL, METERED ORAL at 16:30

## 2018-08-31 RX ADMIN — SODIUM CHLORIDE 1000 MILLILITER(S): 9 INJECTION INTRAMUSCULAR; INTRAVENOUS; SUBCUTANEOUS at 04:06

## 2018-08-31 RX ADMIN — CHLORHEXIDINE GLUCONATE 1 APPLICATION(S): 213 SOLUTION TOPICAL at 14:04

## 2018-08-31 RX ADMIN — Medication 10: at 11:27

## 2018-08-31 RX ADMIN — Medication 3.49 MICROGRAM(S)/KG/MIN: at 22:04

## 2018-08-31 RX ADMIN — PROPOFOL 21 MICROGRAM(S)/KG/MIN: 10 INJECTION, EMULSION INTRAVENOUS at 22:05

## 2018-08-31 RX ADMIN — MIDAZOLAM HYDROCHLORIDE 8 MILLIGRAM(S): 1 INJECTION, SOLUTION INTRAMUSCULAR; INTRAVENOUS at 05:40

## 2018-08-31 RX ADMIN — SODIUM CHLORIDE 1000 MILLILITER(S): 9 INJECTION INTRAMUSCULAR; INTRAVENOUS; SUBCUTANEOUS at 01:41

## 2018-08-31 RX ADMIN — Medication 3 MILLILITER(S): at 04:52

## 2018-08-31 RX ADMIN — FENTANYL CITRATE 100 MICROGRAM(S): 50 INJECTION INTRAVENOUS at 07:15

## 2018-08-31 RX ADMIN — Medication 1 PUFF(S): at 10:53

## 2018-08-31 RX ADMIN — Medication 2 GRAM(S): at 01:21

## 2018-08-31 RX ADMIN — Medication 1 PUFF(S): at 09:57

## 2018-08-31 RX ADMIN — Medication 1 PUFF(S): at 20:31

## 2018-08-31 RX ADMIN — ALBUTEROL 2.5 MILLIGRAM(S): 90 AEROSOL, METERED ORAL at 01:48

## 2018-08-31 RX ADMIN — CISATRACURIUM BESYLATE 13.39 MICROGRAM(S)/KG/MIN: 2 INJECTION INTRAVENOUS at 22:05

## 2018-08-31 RX ADMIN — Medication 60 MILLIGRAM(S): at 15:02

## 2018-08-31 RX ADMIN — PROPOFOL 30 MILLIGRAM(S): 10 INJECTION, EMULSION INTRAVENOUS at 05:42

## 2018-08-31 RX ADMIN — Medication 60 MILLIGRAM(S): at 22:04

## 2018-08-31 RX ADMIN — CISATRACURIUM BESYLATE 20 MILLIGRAM(S): 2 INJECTION INTRAVENOUS at 06:25

## 2018-08-31 RX ADMIN — Medication 6.56 MICROGRAM(S)/KG/MIN: at 07:00

## 2018-08-31 RX ADMIN — Medication 3.49 MICROGRAM(S)/KG/MIN: at 11:27

## 2018-08-31 RX ADMIN — SODIUM CHLORIDE 1000 MILLILITER(S): 9 INJECTION INTRAMUSCULAR; INTRAVENOUS; SUBCUTANEOUS at 01:21

## 2018-08-31 RX ADMIN — CISATRACURIUM BESYLATE 13.39 MICROGRAM(S)/KG/MIN: 2 INJECTION INTRAVENOUS at 09:17

## 2018-08-31 RX ADMIN — Medication 1 PUFF(S): at 16:30

## 2018-08-31 RX ADMIN — Medication 4 MILLIGRAM(S): at 06:10

## 2018-08-31 RX ADMIN — Medication 5 MILLIGRAM(S): at 03:20

## 2018-08-31 RX ADMIN — PROPOFOL 21 MICROGRAM(S)/KG/MIN: 10 INJECTION, EMULSION INTRAVENOUS at 09:17

## 2018-08-31 RX ADMIN — ALBUTEROL 2 PUFF(S): 90 AEROSOL, METERED ORAL at 13:00

## 2018-08-31 RX ADMIN — ALBUTEROL 2 PUFF(S): 90 AEROSOL, METERED ORAL at 20:31

## 2018-08-31 RX ADMIN — Medication 60 MILLIGRAM(S): at 09:17

## 2018-08-31 RX ADMIN — ALBUTEROL 2 PUFF(S): 90 AEROSOL, METERED ORAL at 11:00

## 2018-08-31 RX ADMIN — ENOXAPARIN SODIUM 40 MILLIGRAM(S): 100 INJECTION SUBCUTANEOUS at 11:28

## 2018-08-31 RX ADMIN — Medication 6.56 MICROGRAM(S)/KG/MIN: at 09:18

## 2018-08-31 NOTE — H&P ADULT - ASSESSMENT
78F with COPD, asthma, GERD, HLD, HTN, spinal stenosis w/ hardware, colon CA (dx. 02/2018 s/p colon resection in 03/2018) presents for worsening shortness of breath and tachypnea found to be positive for hMPV with severe COPD exacerbation requiring emergent intubation. 78F with COPD, asthma, GERD, HLD, HTN, spinal stenosis w/ hardware, colon CA (dx. 02/2018 s/p colon resection in 03/2018) presents for worsening shortness of breath and tachypnea found to be positive for hMPV with severe COPD exacerbation requiring emergent intubation.    # Neuro    # CV    # Pulm    # 78F with COPD, asthma, GERD, HLD, HTN, spinal stenosis w/ hardware, colon CA (dx. 02/2018 s/p colon resection in 03/2018) presents for worsening shortness of breath and tachypnea found to be positive for hMPV with severe COPD exacerbation requiring emergent intubation.    # Neuro: Sedated and paralyzed  - propofol for sedation with daily sedation vacation  - Nimbex gtt for paralysis      # CV: Severe LV systolic dysfunction on POCUS   - history of CAD, LHC with PCI  - possibly Takotsubo/stress-induced cardiomyopathy  - check serum troponin T and EKG       # Pulm: Hypercapneic respiratory failure with COPD exacerbation  - likely secondary to hMPV respiratory infection with diffuse wheezes on exam  - emergently intubated and paralyzed to optimize ventilation   - start Solu-Medrol 60 q6, albuterol HFA and Atrovent q2h x 6 hours, then q4h    # ID: Sepsis secondary to hMPV respiratory infection  - will hold on azithromycin for anti-inflammatory effect given    # Renal     # Onc: Colon cancer s/p resection in 03/2018 on chemotherapy  - on platin therapy (last given 8/20) and Xeloda (last given 8/25)  - follows with outpatient Oncology 78F with COPD, asthma, GERD, HLD, HTN, spinal stenosis w/ hardware, colon CA (dx. 02/2018 s/p colon resection in 03/2018) presents for worsening shortness of breath and tachypnea found to be positive for hMPV with severe COPD exacerbation requiring emergent intubation.    # Neuro: Sedated and paralyzed  - propofol for sedation with daily sedation vacation  - Nimbex gtt for paralysis      # CV: Severe LV systolic dysfunction on POCUS   - history of CAD, LHC with PCI  - possibly Takotsubo/stress-induced cardiomyopathy  - check serum troponin T and EKG       # Pulm: Hypercapneic respiratory failure with COPD exacerbation  - likely secondary to hMPV respiratory infection with diffuse wheezes on exam  - emergently intubated and paralyzed to optimize ventilation   - start Solu-Medrol 60 q6, albuterol HFA and Atrovent q2h x 6 hours, then q4h    # ID: Sepsis secondary to hMPV respiratory infection  - monitor off antibiotics given CXR with clear lungs   - s/p 3L IVF resuscitation in ED     # Onc: Colon cancer s/p resection in 03/2018 on chemotherapy  - on oxaliplatin therapy (last given 8/20) and Xeloda (last given 8/25)  - need to contact outpatient Oncologist Dr. Juancarlos Yepez ([phone])    # Renal: serum Cr at baseline     # DVT ppx: Lovenox 40 subQ    Akosua Cornejo MD  PGY3 | MICU Resident  Dept. Internal Medicine 78F with COPD, asthma, GERD, HLD, HTN, spinal stenosis w/ hardware, colon CA (dx. 02/2018 s/p colon resection in 03/2018) presents for worsening shortness of breath and tachypnea found to be positive for hMPV with severe COPD exacerbation requiring emergent intubation.    # Neuro: Sedated and paralyzed  - propofol for sedation with daily sedation vacation  - Nimbex gtt for paralysis      # CV: Severe LV systolic dysfunction on POCUS   - history of CAD unclear, will clarify with PMD and daughter  - possibly Takotsubo/stress-induced cardiomyopathy  - given 3L IVF per sepsis protocol, can consider IV diuresis  - check serum troponin T and EKG    Hypotension likely due to propofol vs. stress-induced cardiomyopathy  - on Levophed, titrate to MAP >65    # Pulm: Hypercapneic respiratory failure with COPD exacerbation  - likely secondary to hMPV respiratory infection with diffuse wheezes on exam  - emergently intubated and paralyzed to optimize ventilation   - start Solu-Medrol 60 q6, albuterol HFA and Atrovent q2h x 6 hours, then q4h    # ID: Sepsis secondary to hMPV respiratory infection  - monitor off antibiotics given CXR with clear lungs   - s/p 3L IVF resuscitation in ED     # Onc: Colon cancer s/p resection in 03/2018 on chemotherapy  - on oxaliplatin therapy (last given 8/20) and Xeloda (last given 8/25)  - need to contact outpatient Oncologist Dr. Juancarlos Yepez ([phone])    # Renal: serum Cr at baseline     # DVT ppx: Lovenox 40 subQ    Akosua Cornejo MD  PGY3 | MICU Resident  Dept. Internal Medicine

## 2018-08-31 NOTE — H&P ADULT - ATTENDING COMMENTS
this women deteriorated quickly, she has human metapneumovirus complicated by pre-existing severe airflow obstruction, she is now intubated and needs paralysis for severe air trapping/guarded

## 2018-08-31 NOTE — H&P ADULT - HISTORY OF PRESENT ILLNESS
78F with COPD, asthma, GERD, hyperlipidemia presents for cough, fever and difficulty breathing worsening over the past 2 days. History obtained from daughter at bedside as patient on BiPAP. Symptoms not improved after patient started on prednisone, DuoNeb and amoxicillin by PMD yesterday. Patient endorses sputum production and continued difficulty with breathing today. ROS notable for fever to T 101.3 F on 2 days ago however no sick contacts, nausea, emesis, abdominal pain, diarrhea, chest pain or dizziness. Daughter clarifies that patient was never a smoker but exposed to significant second-hand smoke exposure. On arrival to ED, patient tachypneic with diffuse wheezes, given albuterol x1, DuoNeb x2, Solu-Medrol 125 IV x1, and Mg IV x1. Patient then placed on BiPAP for increased WOB. Patient seen and examined by MICU team. Initially patient endorsed SOB/work of breathing had significantly improved with trial of BiPAP. Patient noted to have only minimal retractions on exam, speaking full sentences while on BiPAP mask and had no indication for intubation. Patient subsequently admitted to Medicine floor however WOB acutely worsened in interval period and MICU was re-consulted for repeat evaluation. Patient appeared uncomfortable, with increased retractions/effort to breathe while on BiPAP despite DuoNeb treatment. Bedside POCUS revealed very hyperdyanmic 78F with COPD, asthma, GERD, HLD, HTN presents for cough, fever and difficulty breathing worsening over the past 2 days. History obtained from daughter at bedside as patient on BiPAP. Symptoms not improved after patient started on prednisone, DuoNeb and amoxicillin by PMD yesterday. Patient endorses sputum production and continued difficulty with breathing today. ROS notable for fever to T 101.3 F on 2 days ago however no sick contacts, nausea, emesis, abdominal pain, diarrhea, chest pain or dizziness. Daughter clarifies that patient was never a smoker but exposed to significant second-hand smoke exposure. On arrival to ED, patient tachypneic with diffuse wheezes, given albuterol x1, DuoNeb x2, Solu-Medrol 125 IV x1, and Mg IV x1. Patient then placed on BiPAP for increased WOB. Patient seen and examined by MICU team. Initially patient endorsed SOB/work of breathing had significantly improved with trial of BiPAP. Patient noted to have only minimal retractions on exam, speaking full sentences while on BiPAP mask and had no indication for intubation.     Patient subsequently admitted to Medicine floor however WOB acutely worsened in interval period and MICU was re-consulted for repeat evaluation. Patient appeared uncomfortable with increased retractions/effort to breathe while on BiPAP despite DuoNeb treatment. RVP resulted positive for hPMV in interval period. Bedside POCUS revealed severe LV dysfunction concerning for Takotsubo cardiomyopathy and scattered B lines. Given likely multifocal etiology for impending respiratory failure, patient was then emergently intubated by MICU team in ED. 78F with COPD, asthma, GERD, HLD, HTN, spinal stenosis w/ hardware presents for cough, fever and difficulty breathing worsening over the past 2 days. History obtained from daughter at bedside as patient on BiPAP. Symptoms not improved after patient started on prednisone, DuoNeb and amoxicillin by PMD yesterday. Patient endorses sputum production and continued difficulty with breathing today. ROS notable for fever to T 101.3 F on 2 days ago however no sick contacts, nausea, emesis, abdominal pain, diarrhea, chest pain or dizziness. Daughter clarifies that patient was never a smoker but exposed to significant second-hand smoke exposure. On arrival to ED, patient tachypneic with diffuse wheezes, given albuterol x1, DuoNeb x2, Solu-Medrol 125 IV x1, and Mg IV x1. Patient then placed on BiPAP for increased WOB. Patient seen and examined by MICU team. Initially patient endorsed SOB/work of breathing had significantly improved with trial of BiPAP. Patient noted to have only minimal retractions on exam, speaking full sentences while on BiPAP mask and had no indication for intubation.     Patient subsequently admitted to Medicine floor however WOB acutely worsened in interval period and MICU was re-consulted for repeat evaluation. Patient appeared uncomfortable with increased retractions/effort to breathe while on BiPAP despite DuoNeb treatment. RVP resulted positive for hPMV in interval period. Bedside POCUS revealed severe LV dysfunction concerning for Takotsubo cardiomyopathy and scattered B lines. Given likely multifocal etiology for impending respiratory failure, patient was then emergently intubated by MICU team in ED. 78F with COPD, asthma, GERD, HLD, HTN, spinal stenosis w/ hardware, colon CA (dx. 02/2018 s/p colon resection in 03/2018) presents for cough, fever and difficulty breathing worsening over the past 2 days. History obtained from daughter at bedside as patient on BiPAP. Symptoms not improved after patient started on prednisone, DuoNeb and amoxicillin by PMD yesterday. Patient endorses sputum production and continued difficulty with breathing today. ROS notable for fever to T 101.3 F on 2 days ago however no sick contacts, nausea, emesis, abdominal pain, diarrhea, chest pain or dizziness. Daughter clarifies that patient was never a smoker but exposed to significant second-hand smoke exposure. On arrival to ED, patient tachypneic with diffuse wheezes, given albuterol x1, DuoNeb x2, Solu-Medrol 125 IV x1, and Mg IV x1. Patient then placed on BiPAP for increased WOB. Patient seen and examined by MICU team. Initially patient endorsed SOB/work of breathing had significantly improved with trial of BiPAP. Patient noted to have only minimal retractions on exam, speaking full sentences while on BiPAP mask and had no indication for intubation.     Patient subsequently admitted to Medicine floor however WOB acutely worsened in interval period and MICU was re-consulted for repeat evaluation. Patient appeared uncomfortable with increased retractions/effort to breathe while on BiPAP despite DuoNeb treatment. RVP resulted positive for hPMV in interval period. Bedside POCUS revealed severe LV dysfunction concerning for Takotsubo cardiomyopathy and scattered B lines. Given likely multifocal etiology for impending respiratory failure, patient was then emergently intubated by MICU team in ED. 78F with COPD, asthma, GERD, HLD, HTN, spinal stenosis w/ hardware, colon CA (dx. 02/2018 s/p colon resection in 03/2018) on chemotherapy (platin/Xeloda) presents for cough, fever and difficulty breathing worsening over the past 2 days. History obtained from daughter at bedside as patient on BiPAP. Symptoms not improved after patient started on prednisone, DuoNeb and amoxicillin by PMD yesterday. Patient endorses sputum production and continued difficulty with breathing today. ROS notable for fever to T 101.3 F on 2 days ago however no sick contacts, nausea, emesis, abdominal pain, diarrhea, chest pain or dizziness. Daughter clarifies that patient was never a smoker but exposed to significant second-hand smoke exposure. On arrival to ED, patient tachypneic with diffuse wheezes, given albuterol x1, DuoNeb x2, Solu-Medrol 125 IV x1, and Mg IV x1. Patient then placed on BiPAP for increased WOB. Patient seen and examined by MICU team. Initially patient endorsed SOB/work of breathing had significantly improved with trial of BiPAP. Patient noted to have only minimal retractions on exam, speaking full sentences while on BiPAP mask and had no indication for intubation.     Patient subsequently admitted to Medicine floor however WOB acutely worsened in interval period and MICU was re-consulted for repeat evaluation. Patient appeared uncomfortable with increased retractions/effort to breathe while on BiPAP despite DuoNeb treatment. RVP resulted positive for hPMV in interval period. Bedside POCUS revealed severe LV dysfunction concerning for Takotsubo cardiomyopathy and scattered B lines. Given likely multifocal etiology for impending respiratory failure, patient was then emergently intubated by MICU team in ED.

## 2018-08-31 NOTE — ED ADULT NURSE REASSESSMENT NOTE - NS ED NURSE REASSESS COMMENT FT1
Patient brought to ESSu 1  and became tachycardic 150s, increased WOB , diaphoretic and states she feels "fatigued." and cannot breathe. Bipap in place O2 sat 100% patient tachypneic r-33. Respiratory at bedside, MD Samuels informed and endorsed to reassess patient.  duoneb administered to patient @0500 as per MAR. MICU consulted patient and patient moved back to room 20 in ED for further management with MICU at bedside. @0515 MD Nolasco notified to prep for intubation. Patient becoming increasingly lethargic in room. @0540 patient intubated by MICU SHEILA Kimball with 7.5 ettube at 20 at [facility] which was moved to 21. Charge RN, 3 float RN at bedside. IV via sono placed in upper left iinner arm by SHEILA Kimball, #18 g placed in rt hand by float rn.
break coverage RN- pt on Bi-pap speaking in full sentences, tachypneic and tachycardic on cardiac monitor- fluids running as ordered, will repeat VBG accordingly
Patient endorsing SOB, wheezing on Bipap tachycardic to 150s and slightly diaphoretic. Expiratory wheezing heard on auscultation of right upper lobe, MD Yonathan Hinkle aware, 2nd l of NS hung and albuterol nebulizer administered as per MD order. Will continue to monitor.
Patient appears improved on Bipap, ICU team consulting at bedside plan is for RCU admission and continue Bipap. Daughter at bedside, will continue to monitor.

## 2018-08-31 NOTE — CONSULT NOTE ADULT - ASSESSMENT
78F with COPD, asthma, GERD, hyperlipidemia presents for cough, fever and difficulty breathing worsening over the past 2 days secondary to COPD exacerbation, respiratory distress now significantly approved with noninvasive ventilation. 78F with COPD, asthma, GERD, hyperlipidemia presents for cough, fever and difficulty breathing worsening over the past 2 days secondary to COPD exacerbation. MICU consulted due to need for possible intubation however patient respiratory distress now significantly approved with non-invasive ventilation/BiPAP.

## 2018-08-31 NOTE — H&P ADULT - NSHPPHYSICALEXAM_GEN_ALL_CORE
Vital Signs Last 24 Hrs  T(C): 37.3 (31 Aug 2018 06:52), Max: 37.7 (30 Aug 2018 23:09)  T(F): 99.1 (31 Aug 2018 06:52), Max: 99.8 (30 Aug 2018 23:09)  HR: 56 (31 Aug 2018 07:30) (56 - 157)  BP: 108/55 (31 Aug 2018 07:30) (62/47 - 204/110)  BP(mean): 69 (31 Aug 2018 07:30) (51 - 74)  RR: 12 (31 Aug 2018 07:30) (12 - 30)  SpO2: 100% (31 Aug 2018 07:30) (88% - 100%)    GENERAL: AOx3, NAD/ill-appearing, appears stated age   HEENT: pupils equal & reactive, no scleral icterus  NECK: supple, trachea midline, no thyromegaly  CARDIAC: S1 & S2 auscultated, RRR, no murmurs, no JVD   CHEST: breath sounds equal B/L, no increased WOB, no wheezes/crackles  ABDOMEN: ND, bowel sounds present, soft, NTTP, no organomegaly   NEURO: CN II-XII grossly intact, no focal deficits  MSKl: Strength appropriate in all extremities for age, ROM normal  SKIN: Warm and dry, no diffuse rashes, no wounds  PSYCH: Speech fluid, appropriate mood and affect  EXT: No LE edema, pedal pulses 2+ and equal B/L, capillary refill < 2 s

## 2018-08-31 NOTE — PROVIDER CONTACT NOTE (OTHER) - SITUATION
Patient had another episode of tachycardia and possible svt ER team aware and med administered as per order. PAtient  currently. MD states will come to reassess. patient.

## 2018-08-31 NOTE — PROCEDURE NOTE - NSTRACHPOSTINTU_RESP_A_CORE
Appropriate capnography/Breath sounds equal/Chest excursion noted/Chest X-Ray/Breath sounds bilateral/Positive end tidal Co2 noted

## 2018-08-31 NOTE — H&P ADULT - NSHPREVIEWOFSYSTEMS_GEN_ALL_CORE
CONST: no fevers, no chills  EYES: no pain  ENT: no sore throat   CV: no chest pain  RESP: no shortness of breath  ABD: no abdominal pain   : no dysuria  MSK: no back pain  NEURO: no headache or additional neurologic complaints  HEME: no easy bleeding  SKIN:  no rash Unable to obtain as patient in respiratory distress on BiPAP.

## 2018-08-31 NOTE — CONSULT NOTE ADULT - SUBJECTIVE AND OBJECTIVE BOX
CHIEF COMPLAINT:    HPI:    PAST MEDICAL & SURGICAL HISTORY:  GERD (gastroesophageal reflux disease)  COPD with asthma  Colon polyp  Spinal stenosis  Hyperlipidemia  Hypertension  History of bilateral hip replacements  History of laminectomy: 2013      FAMILY HISTORY:      SOCIAL HISTORY:  Smoking: [ ] Never Smoked [ ] Former Smoker (__ packs x ___ years) [ ] Current Smoker  (__ packs x ___ years)  Substance Use: [ ] Never Used [ ] Used ____  EtOH Use:  Marital Status: [ ] Single [ ]  [ ]  [ ]   Sexual History:   Occupation:  Recent Travel:  Country of Birth:  Advance Directives:    Allergies    Carrots (Pruritus)  dust (Pruritus)  No Known Drug Allergies  Nuts (Pruritus)  shellfish (Pruritus)    Intolerances        HOME MEDICATIONS:    REVIEW OF SYSTEMS:  Constitutional: [ ] negative [ ] fevers [ ] chills [ ] weight loss [ ] weight gain  HEENT: [ ] negative [ ] dry eyes [ ] eye irritation [ ] postnasal drip [ ] nasal congestion  CV: [ ] negative  [ ] chest pain [ ] orthopnea [ ] palpitations [ ] murmur  Resp: [ ] negative [ ] cough [ ] shortness of breath [ ] dyspnea [ ] wheezing [ ] sputum [ ] hemoptysis  GI: [ ] negative [ ] nausea [ ] vomiting [ ] diarrhea [ ] constipation [ ] abd pain [ ] dysphagia   : [ ] negative [ ] dysuria [ ] nocturia [ ] hematuria [ ] increased urinary frequency  Musculoskeletal: [ ] negative [ ] back pain [ ] myalgias [ ] arthralgias [ ] fracture  Skin: [ ] negative [ ] rash [ ] itch  Neurological: [ ] negative [ ] headache [ ] dizziness [ ] syncope [ ] weakness [ ] numbness  Psychiatric: [ ] negative [ ] anxiety [ ] depression  Endocrine: [ ] negative [ ] diabetes [ ] thyroid problem  Hematologic/Lymphatic: [ ] negative [ ] anemia [ ] bleeding problem  Allergic/Immunologic: [ ] negative [ ] itchy eyes [ ] nasal discharge [ ] hives [ ] angioedema  [ ] All other systems negative  [ ] Unable to assess ROS because ________    OBJECTIVE:  ICU Vital Signs Last 24 Hrs  T(C): 37.7 (30 Aug 2018 23:09), Max: 37.7 (30 Aug 2018 23:09)  T(F): 99.8 (30 Aug 2018 23:09), Max: 99.8 (30 Aug 2018 23:09)  HR: 125 (31 Aug 2018 00:32) (123 - 145)  BP: 174/64 (31 Aug 2018 00:32) (160/76 - 204/110)  BP(mean): --  ABP: --  ABP(mean): --  RR: 16 (31 Aug 2018 00:32) (16 - 30)  SpO2: 100% (31 Aug 2018 00:32) (88% - 100%)        CAPILLARY BLOOD GLUCOSE          PHYSICAL EXAM:  General:   HEENT:   Lymph Nodes:  Neck:   Respiratory:   Cardiovascular:   Abdomen:   Extremities:   Skin:   Neurological:  Psychiatry:    LINES:     HOSPITAL MEDICATIONS:                    sodium chloride 0.9% Bolus 1000 milliLiter(s) IV Bolus once            LABS:                        11.0   13.55 )-----------( 120      ( 30 Aug 2018 22:40 )             33.4     Hgb Trend: 11.0<--  08-30    132<L>  |  95<L>  |  15  ----------------------------<  263<H>  4.4   |  19<L>  |  0.63    Ca    8.7      30 Aug 2018 22:40    TPro  6.7  /  Alb  3.4  /  TBili  1.1  /  DBili  x   /  AST  86<H>  /  ALT  44<H>  /  AlkPhos  126<H>  08-30    Creatinine Trend: 0.63<--        Venous Blood Gas:  08-30 @ 22:40  7.30/47/48/21/77.9  VBG Lactate: 3.7      MICROBIOLOGY:     RADIOLOGY:  [ ] Reviewed and interpreted by me    EKG: MICU consulted for COPD exacerbation. Patient was seen and examined at the time of consult.   At this time, vital signs are stable, respiratory status/dyspnea improved on BiPAP and patient does not warrant MICU level of care.   Reconsult as appropriate. Full consult note to follow.     Akosua Cornejo MD  PGY-3| MICU Resident  Dept. Internal Medicine    CHIEF COMPLAINT:    HPI:    PAST MEDICAL & SURGICAL HISTORY:  GERD (gastroesophageal reflux disease)  COPD with asthma  Colon polyp  Spinal stenosis  Hyperlipidemia  Hypertension  History of bilateral hip replacements  History of laminectomy: 2013      FAMILY HISTORY:      SOCIAL HISTORY:  Smoking: [ ] Never Smoked [ ] Former Smoker (__ packs x ___ years) [ ] Current Smoker  (__ packs x ___ years)  Substance Use: [ ] Never Used [ ] Used ____  EtOH Use:  Marital Status: [ ] Single [ ]  [ ]  [ ]   Sexual History:   Occupation:  Recent Travel:  Country of Birth:  Advance Directives:    Allergies    Carrots (Pruritus)  dust (Pruritus)  No Known Drug Allergies  Nuts (Pruritus)  shellfish (Pruritus)    Intolerances        HOME MEDICATIONS:    REVIEW OF SYSTEMS:  Constitutional: [ ] negative [ ] fevers [ ] chills [ ] weight loss [ ] weight gain  HEENT: [ ] negative [ ] dry eyes [ ] eye irritation [ ] postnasal drip [ ] nasal congestion  CV: [ ] negative  [ ] chest pain [ ] orthopnea [ ] palpitations [ ] murmur  Resp: [ ] negative [ ] cough [ ] shortness of breath [ ] dyspnea [ ] wheezing [ ] sputum [ ] hemoptysis  GI: [ ] negative [ ] nausea [ ] vomiting [ ] diarrhea [ ] constipation [ ] abd pain [ ] dysphagia   : [ ] negative [ ] dysuria [ ] nocturia [ ] hematuria [ ] increased urinary frequency  Musculoskeletal: [ ] negative [ ] back pain [ ] myalgias [ ] arthralgias [ ] fracture  Skin: [ ] negative [ ] rash [ ] itch  Neurological: [ ] negative [ ] headache [ ] dizziness [ ] syncope [ ] weakness [ ] numbness  Psychiatric: [ ] negative [ ] anxiety [ ] depression  Endocrine: [ ] negative [ ] diabetes [ ] thyroid problem  Hematologic/Lymphatic: [ ] negative [ ] anemia [ ] bleeding problem  Allergic/Immunologic: [ ] negative [ ] itchy eyes [ ] nasal discharge [ ] hives [ ] angioedema  [ ] All other systems negative  [ ] Unable to assess ROS because ________    OBJECTIVE:  ICU Vital Signs Last 24 Hrs  T(C): 37.7 (30 Aug 2018 23:09), Max: 37.7 (30 Aug 2018 23:09)  T(F): 99.8 (30 Aug 2018 23:09), Max: 99.8 (30 Aug 2018 23:09)  HR: 125 (31 Aug 2018 00:32) (123 - 145)  BP: 174/64 (31 Aug 2018 00:32) (160/76 - 204/110)  BP(mean): --  ABP: --  ABP(mean): --  RR: 16 (31 Aug 2018 00:32) (16 - 30)  SpO2: 100% (31 Aug 2018 00:32) (88% - 100%)        CAPILLARY BLOOD GLUCOSE          PHYSICAL EXAM:  General:   HEENT:   Lymph Nodes:  Neck:   Respiratory:   Cardiovascular:   Abdomen:   Extremities:   Skin:   Neurological:  Psychiatry:    LINES:     HOSPITAL MEDICATIONS:                    sodium chloride 0.9% Bolus 1000 milliLiter(s) IV Bolus once            LABS:                        11.0   13.55 )-----------( 120      ( 30 Aug 2018 22:40 )             33.4     Hgb Trend: 11.0<--  08-30    132<L>  |  95<L>  |  15  ----------------------------<  263<H>  4.4   |  19<L>  |  0.63    Ca    8.7      30 Aug 2018 22:40    TPro  6.7  /  Alb  3.4  /  TBili  1.1  /  DBili  x   /  AST  86<H>  /  ALT  44<H>  /  AlkPhos  126<H>  08-30    Creatinine Trend: 0.63<--        Venous Blood Gas:  08-30 @ 22:40  7.30/47/48/21/77.9  VBG Lactate: 3.7      MICROBIOLOGY:     RADIOLOGY:  [ ] Reviewed and interpreted by me    EKG: MICU consulted for COPD exacerbation. Patient was seen and examined at the time of consult.   At this time, vital signs are stable, respiratory status/dyspnea improved on BiPAP and patient does not warrant MICU level of care.   Reconsult as appropriate. Full consult note to follow.     Akousa Cornejo MD  PGY-3| MICU Resident  Dept. Internal Medicine    CHIEF COMPLAINT:    HPI:    78F with COPD, asthma, GERD, hyperlipidemia presents for cough, fever and difficulty breathing worsening over the past 2 days. History obtained from daughter at bedside as patient on BiPAP. Symptoms not improved after patient started on prednisone, DuoNeb and amoxicillin by PMD yesterday. Patient endorses sputum production and continued difficulty with breathing today. ROS notable for fever to T 101.3 F on 2 days ago.     PAST MEDICAL & SURGICAL HISTORY:  GERD (gastroesophageal reflux disease)  COPD with asthma  Colon polyp  Spinal stenosis  Hyperlipidemia  Hypertension  History of bilateral hip replacements  History of laminectomy: 2013      FAMILY HISTORY:      SOCIAL HISTORY:  Smoking: [x ] Never Smoked [ ] Former Smoker (__ packs x ___ years) [ ] Current Smoker  (__ packs x ___ years)  Substance Use: [ ] Never Used [ ] Used ____  EtOH Use:  Marital Status: [ ] Single [ ]  [ ]  [ ]   Sexual History:   Occupation:  Recent Travel:  Country of Birth:  Advance Directives:    Allergies    Carrots (Pruritus)  dust (Pruritus)  No Known Drug Allergies  Nuts (Pruritus)  shellfish (Pruritus)    Intolerances        HOME MEDICATIONS:    REVIEW OF SYSTEMS:  Constitutional: [ ] negative [ ] fevers [ ] chills [ ] weight loss [ ] weight gain  HEENT: [ ] negative [ ] dry eyes [ ] eye irritation [ ] postnasal drip [ ] nasal congestion  CV: [ ] negative  [ ] chest pain [ ] orthopnea [ ] palpitations [ ] murmur  Resp: [ ] negative [ ] cough [ ] shortness of breath [ ] dyspnea [ ] wheezing [ ] sputum [ ] hemoptysis  GI: [ ] negative [ ] nausea [ ] vomiting [ ] diarrhea [ ] constipation [ ] abd pain [ ] dysphagia   : [ ] negative [ ] dysuria [ ] nocturia [ ] hematuria [ ] increased urinary frequency  Musculoskeletal: [ ] negative [ ] back pain [ ] myalgias [ ] arthralgias [ ] fracture  Skin: [ ] negative [ ] rash [ ] itch  Neurological: [ ] negative [ ] headache [ ] dizziness [ ] syncope [ ] weakness [ ] numbness  Psychiatric: [ ] negative [ ] anxiety [ ] depression  Endocrine: [ ] negative [ ] diabetes [ ] thyroid problem  Hematologic/Lymphatic: [ ] negative [ ] anemia [ ] bleeding problem  Allergic/Immunologic: [ ] negative [ ] itchy eyes [ ] nasal discharge [ ] hives [ ] angioedema  [ ] All other systems negative  [ ] Unable to assess ROS because ________    OBJECTIVE:  ICU Vital Signs Last 24 Hrs  T(C): 37.7 (30 Aug 2018 23:09), Max: 37.7 (30 Aug 2018 23:09)  T(F): 99.8 (30 Aug 2018 23:09), Max: 99.8 (30 Aug 2018 23:09)  HR: 125 (31 Aug 2018 00:32) (123 - 145)  BP: 174/64 (31 Aug 2018 00:32) (160/76 - 204/110)  BP(mean): --  ABP: --  ABP(mean): --  RR: 16 (31 Aug 2018 00:32) (16 - 30)  SpO2: 100% (31 Aug 2018 00:32) (88% - 100%)        CAPILLARY BLOOD GLUCOSE          PHYSICAL EXAM:  General:   HEENT:   Lymph Nodes:  Neck:   Respiratory:   Cardiovascular:   Abdomen:   Extremities:   Skin:   Neurological:  Psychiatry:    LINES:     HOSPITAL MEDICATIONS:                    sodium chloride 0.9% Bolus 1000 milliLiter(s) IV Bolus once            LABS:                        11.0   13.55 )-----------( 120      ( 30 Aug 2018 22:40 )             33.4     Hgb Trend: 11.0<--  08-30    132<L>  |  95<L>  |  15  ----------------------------<  263<H>  4.4   |  19<L>  |  0.63    Ca    8.7      30 Aug 2018 22:40    TPro  6.7  /  Alb  3.4  /  TBili  1.1  /  DBili  x   /  AST  86<H>  /  ALT  44<H>  /  AlkPhos  126<H>  08-30    Creatinine Trend: 0.63<--        Venous Blood Gas:  08-30 @ 22:40  7.30/47/48/21/77.9  VBG Lactate: 3.7      MICROBIOLOGY:     RADIOLOGY:  [ ] Reviewed and interpreted by me    EKG: MICU consulted for COPD exacerbation. Patient was seen and examined at the time of consult.   At this time, vital signs are stable, respiratory status/dyspnea improved on BiPAP and patient does not warrant MICU level of care.   Reconsult as appropriate. Full consult note to follow.     Akosua Cornejo MD  PGY-3| MICU Resident  Dept. Internal Medicine    CHIEF COMPLAINT:    HPI:    78F with COPD, asthma, GERD, hyperlipidemia presents for cough, fever and difficulty breathing worsening over the past 2 days. History obtained from daughter at bedside as patient on BiPAP. Symptoms not improved after patient started on prednisone, DuoNeb and amoxicillin by PMD yesterday. Patient endorses sputum production and continued difficulty with breathing today. ROS notable for fever to T 101.3 F on 2 days ago however no sick contacts, nausea, emesis, abdominal pain, diarrhea, chest pain or dizziness. Daughter clarifies that patient was never a smokerOn arrival to ED, patient tachypneic with diffuse wheezes, given albuterol x1, DuoNeb x2, Solu-Medrol 125 IV x1, and Mg IV x1. Patient then placed on BiPAP for increased WOB. Patient seen and examined by MICU team. She states is now significantly improved with trial of BiPAP and daughter endorses improvement in difficulty with breathing.     PAST MEDICAL & SURGICAL HISTORY:  GERD (gastroesophageal reflux disease)  COPD with asthma  Colon polyp  Spinal stenosis  Hyperlipidemia  Hypertension  History of bilateral hip replacements  History of laminectomy: 2013      FAMILY HISTORY:      SOCIAL HISTORY:  Smoking: [x ] Never Smoked [ ] Former Smoker (__ packs x ___ years) [ ] Current Smoker  (__ packs x ___ years)  Substance Use: [x ] Never Used [ ] Used ____  EtOH Use: None  Marital Status: [ ] Single [x ]  [ ]  [ ]   Sexual History: N/A  Occupation: Former seamstress  Recent Travel: None  Country of Birth: [facility]  Advance Directives: N/A    Allergies    Carrots (Pruritus)  dust (Pruritus)  No Known Drug Allergies  Nuts (Pruritus)  shellfish (Pruritus)    Intolerances        HOME MEDICATIONS:    REVIEW OF SYSTEMS:  Constitutional: [ ] negative [x ] fevers [ ] chills [ ] weight loss [ ] weight gain  HEENT: [ ] negative [ ] dry eyes [ ] eye irritation [ ] postnasal drip [ ] nasal congestion  CV: [ ] negative  [ ] chest pain [ ] orthopnea [ ] palpitations [ ] murmur  Resp: [ ] negative [ ] cough [ ] shortness of breath [ ] dyspnea [ ] wheezing [ ] sputum [ ] hemoptysis  GI: [ ] negative [ ] nausea [ ] vomiting [ ] diarrhea [ ] constipation [ ] abd pain [ ] dysphagia   : [ ] negative [ ] dysuria [ ] nocturia [ ] hematuria [ ] increased urinary frequency  Musculoskeletal: [ ] negative [ ] back pain [ ] myalgias [ ] arthralgias [ ] fracture  Skin: [ ] negative [ ] rash [ ] itch  Neurological: [ ] negative [ ] headache [ ] dizziness [ ] syncope [ ] weakness [ ] numbness  Psychiatric: [ ] negative [ ] anxiety [ ] depression  Endocrine: [ ] negative [ ] diabetes [ ] thyroid problem  Hematologic/Lymphatic: [ ] negative [ ] anemia [ ] bleeding problem  Allergic/Immunologic: [ ] negative [ ] itchy eyes [ ] nasal discharge [ ] hives [ ] angioedema  [ ] All other systems negative  [ ] Unable to assess ROS because ________    OBJECTIVE:  ICU Vital Signs Last 24 Hrs  T(C): 37.7 (30 Aug 2018 23:09), Max: 37.7 (30 Aug 2018 23:09)  T(F): 99.8 (30 Aug 2018 23:09), Max: 99.8 (30 Aug 2018 23:09)  HR: 125 (31 Aug 2018 00:32) (123 - 145)  BP: 174/64 (31 Aug 2018 00:32) (160/76 - 204/110)  BP(mean): --  ABP: --  ABP(mean): --  RR: 16 (31 Aug 2018 00:32) (16 - 30)  SpO2: 100% (31 Aug 2018 00:32) (88% - 100%)        PHYSICAL EXAM:  General:   HEENT:   Lymph Nodes:  Neck:   Respiratory:   Cardiovascular:   Abdomen:   Extremities:   Skin:   Neurological:  Psychiatry:    LINES:     HOSPITAL MEDICATIONS:    sodium chloride 0.9% Bolus 1000 milliLiter(s) IV Bolus once    LABS:                        11.0   13.55 )-----------( 120      ( 30 Aug 2018 22:40 )             33.4     Hgb Trend: 11.0<--  08-30    132<L>  |  95<L>  |  15  ----------------------------<  263<H>  4.4   |  19<L>  |  0.63    Ca    8.7      30 Aug 2018 22:40    TPro  6.7  /  Alb  3.4  /  TBili  1.1  /  DBili  x   /  AST  86<H>  /  ALT  44<H>  /  AlkPhos  126<H>  08-30    Creatinine Trend: 0.63<--    Venous Blood Gas:  08-30 @ 22:40  7.30/47/48/21/77.9  VBG Lactate: 3.7      MICROBIOLOGY:     RADIOLOGY:  [ ] Reviewed and interpreted by me    EKG: MICU consulted for COPD exacerbation. Patient was seen and examined at the time of consult.   At this time, vital signs are stable, respiratory status/dyspnea improved on BiPAP and patient does not warrant MICU level of care.   Reconsult as appropriate. Full consult note to follow.     Akosua Cornejo MD  PGY-3| MICU Resident  Dept. Internal Medicine    CHIEF COMPLAINT:    HPI:    78F with COPD, asthma, GERD, hyperlipidemia presents for cough, fever and difficulty breathing worsening over the past 2 days. History obtained from daughter at bedside as patient on BiPAP. Symptoms not improved after patient started on prednisone, DuoNeb and amoxicillin by PMD yesterday. Patient endorses sputum production and continued difficulty with breathing today. ROS notable for fever to T 101.3 F on 2 days ago however no sick contacts, nausea, emesis, abdominal pain, diarrhea, chest pain or dizziness. Daughter clarifies that patient was never a smokerOn arrival to ED, patient tachypneic with diffuse wheezes, given albuterol x1, DuoNeb x2, Solu-Medrol 125 IV x1, and Mg IV x1. Patient then placed on BiPAP for increased WOB. Patient seen and examined by MICU team. She states is now significantly improved with trial of BiPAP and daughter endorses improvement in difficulty with breathing.     PAST MEDICAL & SURGICAL HISTORY:  GERD (gastroesophageal reflux disease)  COPD with asthma  Colon polyp  Spinal stenosis  Hyperlipidemia  Hypertension  History of bilateral hip replacements  History of laminectomy: 2013      FAMILY HISTORY:      SOCIAL HISTORY:  Smoking: [x ] Never Smoked [ ] Former Smoker (__ packs x ___ years) [ ] Current Smoker  (__ packs x ___ years)  Substance Use: [x ] Never Used [ ] Used ____  EtOH Use: None  Marital Status: [ ] Single [x ]  [ ]  [ ]   Sexual History: N/A  Occupation: Former seamstress  Recent Travel: None  Country of Birth: [facility]  Advance Directives: N/A    Allergies    Carrots (Pruritus)  dust (Pruritus)  No Known Drug Allergies  Nuts (Pruritus)  shellfish (Pruritus)    Intolerances        HOME MEDICATIONS:    REVIEW OF SYSTEMS:  Constitutional: [ ] negative [x ] fevers [ ] chills [ ] weight loss [ ] weight gain  HEENT: [x ] negative [ ] dry eyes [ ] eye irritation [ ] postnasal drip [ ] nasal congestion  CV: [x ] negative  [ ] chest pain [ ] orthopnea [ ] palpitations [ ] murmur  Resp: [ ] negative [x ] cough [x ] shortness of breath [ ] dyspnea [x ] wheezing [x ] sputum [ ] hemoptysis  GI: [x ] negative [ ] nausea [ ] vomiting [ ] diarrhea [ ] constipation [ ] abd pain [ ] dysphagia   : [x ] negative [ ] dysuria [ ] nocturia [ ] hematuria [ ] increased urinary frequency  Musculoskeletal: [x ] negative [ ] back pain [ ] myalgias [ ] arthralgias [ ] fracture  Skin: [x ] negative [ ] rash [ ] itch  Neurological: [x ] negative [ ] headache [ ] dizziness [ ] syncope [ ] weakness [ ] numbness  Psychiatric: [x ] negative [ ] anxiety [ ] depression  Endocrine: [x ] negative [ ] diabetes [ ] thyroid problem  Hematologic/Lymphatic: [x ] negative [ ] anemia [ ] bleeding problem  Allergic/Immunologic: [x ] negative [ ] itchy eyes [ ] nasal discharge [ ] hives [ ] angioedema  [x ] All other systems negative  [ ] Unable to assess ROS because ________    OBJECTIVE:  ICU Vital Signs Last 24 Hrs  T(C): 37.7 (30 Aug 2018 23:09), Max: 37.7 (30 Aug 2018 23:09)  T(F): 99.8 (30 Aug 2018 23:09), Max: 99.8 (30 Aug 2018 23:09)  HR: 125 (31 Aug 2018 00:32) (123 - 145)  BP: 174/64 (31 Aug 2018 00:32) (160/76 - 204/110)  BP(mean): --  ABP: --  ABP(mean): --  RR: 16 (31 Aug 2018 00:32) (16 - 30)  SpO2: 100% (31 Aug 2018 00:32) (88% - 100%)      PHYSICAL EXAM:  General: NAD, resting comfortably on BiPAP  HEENT:   Lymph Nodes:  Neck:   Respiratory:   Cardiovascular:   Abdomen:   Extremities:   Skin:   Neurological:  Psychiatry:    LINES:     HOSPITAL MEDICATIONS:    sodium chloride 0.9% Bolus 1000 milliLiter(s) IV Bolus once    LABS:                        11.0   13.55 )-----------( 120      ( 30 Aug 2018 22:40 )             33.4     Hgb Trend: 11.0<--  08-30    132<L>  |  95<L>  |  15  ----------------------------<  263<H>  4.4   |  19<L>  |  0.63    Ca    8.7      30 Aug 2018 22:40    TPro  6.7  /  Alb  3.4  /  TBili  1.1  /  DBili  x   /  AST  86<H>  /  ALT  44<H>  /  AlkPhos  126<H>  08-30    Creatinine Trend: 0.63<--    Venous Blood Gas:  08-30 @ 22:40  7.30/47/48/21/77.9  VBG Lactate: 3.7      MICROBIOLOGY:     RADIOLOGY:  [ ] Reviewed and interpreted by me    EKG: MICU consulted for COPD exacerbation. Patient was seen and examined at the time of consult.   At this time, vital signs are stable, respiratory status/dyspnea improved on BiPAP and patient does not warrant MICU level of care.   Reconsult as appropriate. Full consult note to follow.     Akosua Cornejo MD  PGY-3| MICU Resident  Dept. Internal Medicine    CHIEF COMPLAINT:    HPI:    78F with COPD, asthma, GERD, hyperlipidemia presents for cough, fever and difficulty breathing worsening over the past 2 days. History obtained from daughter at bedside as patient on BiPAP. Symptoms not improved after patient started on prednisone, DuoNeb and amoxicillin by PMD yesterday. Patient endorses sputum production and continued difficulty with breathing today. ROS notable for fever to T 101.3 F on 2 days ago however no sick contacts, nausea, emesis, abdominal pain, diarrhea, chest pain or dizziness. Daughter clarifies that patient was never a smokerOn arrival to ED, patient tachypneic with diffuse wheezes, given albuterol x1, DuoNeb x2, Solu-Medrol 125 IV x1, and Mg IV x1. Patient then placed on BiPAP for increased WOB. Patient seen and examined by MICU team. She states is now significantly improved with trial of BiPAP and daughter endorses improvement in difficulty with breathing.     PAST MEDICAL & SURGICAL HISTORY:  GERD (gastroesophageal reflux disease)  COPD with asthma  Colon polyp  Spinal stenosis  Hyperlipidemia  Hypertension  History of bilateral hip replacements  History of laminectomy: 2013      FAMILY HISTORY:      SOCIAL HISTORY:  Smoking: [x ] Never Smoked [ ] Former Smoker (__ packs x ___ years) [ ] Current Smoker  (__ packs x ___ years)  Substance Use: [x ] Never Used [ ] Used ____  EtOH Use: None  Marital Status: [ ] Single [x ]  [ ]  [ ]   Sexual History: N/A  Occupation: Former seamstress  Recent Travel: None  Country of Birth: [facility]  Advance Directives: N/A    Allergies    Carrots (Pruritus)  dust (Pruritus)  No Known Drug Allergies  Nuts (Pruritus)  shellfish (Pruritus)    Intolerances    HOME MEDICATIONS:    REVIEW OF SYSTEMS:  Constitutional: [ ] negative [x ] fevers [ ] chills [ ] weight loss [ ] weight gain  HEENT: [x ] negative [ ] dry eyes [ ] eye irritation [ ] postnasal drip [ ] nasal congestion  CV: [x ] negative  [ ] chest pain [ ] orthopnea [ ] palpitations [ ] murmur  Resp: [ ] negative [x ] cough [x ] shortness of breath [ ] dyspnea [x ] wheezing [x ] sputum [ ] hemoptysis  GI: [x ] negative [ ] nausea [ ] vomiting [ ] diarrhea [ ] constipation [ ] abd pain [ ] dysphagia   : [x ] negative [ ] dysuria [ ] nocturia [ ] hematuria [ ] increased urinary frequency  Musculoskeletal: [x ] negative [ ] back pain [ ] myalgias [ ] arthralgias [ ] fracture  Skin: [x ] negative [ ] rash [ ] itch  Neurological: [x ] negative [ ] headache [ ] dizziness [ ] syncope [ ] weakness [ ] numbness  Psychiatric: [x ] negative [ ] anxiety [ ] depression  Endocrine: [x ] negative [ ] diabetes [ ] thyroid problem  Hematologic/Lymphatic: [x ] negative [ ] anemia [ ] bleeding problem  Allergic/Immunologic: [x ] negative [ ] itchy eyes [ ] nasal discharge [ ] hives [ ] angioedema  [x ] All other systems negative  [ ] Unable to assess ROS because ________    OBJECTIVE:  ICU Vital Signs Last 24 Hrs  T(C): 37.7 (30 Aug 2018 23:09), Max: 37.7 (30 Aug 2018 23:09)  T(F): 99.8 (30 Aug 2018 23:09), Max: 99.8 (30 Aug 2018 23:09)  HR: 125 (31 Aug 2018 00:32) (123 - 145)  BP: 174/64 (31 Aug 2018 00:32) (160/76 - 204/110)  BP(mean): --  ABP: --  ABP(mean): --  RR: 16 (31 Aug 2018 00:32) (16 - 30)  SpO2: 100% (31 Aug 2018 00:32) (88% - 100%)      PHYSICAL EXAM:  General: NAD, resting comfortably on BiPAP  HEENT:   Lymph Nodes:  Neck:   Respiratory:   Cardiovascular:   Abdomen:   Extremities:   Skin:   Neurological:  Psychiatry:    LINES:     HOSPITAL MEDICATIONS:    sodium chloride 0.9% Bolus 1000 milliLiter(s) IV Bolus once    LABS:                        11.0   13.55 )-----------( 120      ( 30 Aug 2018 22:40 )             33.4     Hgb Trend: 11.0<--  08-30    132<L>  |  95<L>  |  15  ----------------------------<  263<H>  4.4   |  19<L>  |  0.63    Ca    8.7      30 Aug 2018 22:40    TPro  6.7  /  Alb  3.4  /  TBili  1.1  /  DBili  x   /  AST  86<H>  /  ALT  44<H>  /  AlkPhos  126<H>  08-30    Creatinine Trend: 0.63<--    Venous Blood Gas:  08-30 @ 22:40  7.30/47/48/21/77.9  VBG Lactate: 3.7    MICROBIOLOGY:     RADIOLOGY:  [x ] Reviewed and interpreted by me    EXAM:  XR CHEST PORTABLE ROUTINE 1V        PROCEDURE DATE:  Aug 30 2018     ******PRELIMINARY REPORT******    ******PRELIMINARY REPORT******            INTERPRETATION:  No urgent findings

## 2018-09-01 LAB
ALBUMIN SERPL ELPH-MCNC: 2.6 G/DL — LOW (ref 3.3–5)
ALP SERPL-CCNC: 107 U/L — SIGNIFICANT CHANGE UP (ref 40–120)
ALT FLD-CCNC: 38 U/L — HIGH (ref 4–33)
AST SERPL-CCNC: 74 U/L — HIGH (ref 4–32)
BASE EXCESS BLDA CALC-SCNC: -2.5 MMOL/L — SIGNIFICANT CHANGE UP
BASOPHILS # BLD AUTO: 0.02 K/UL — SIGNIFICANT CHANGE UP (ref 0–0.2)
BASOPHILS NFR BLD AUTO: 0.2 % — SIGNIFICANT CHANGE UP (ref 0–2)
BILIRUB SERPL-MCNC: 0.6 MG/DL — SIGNIFICANT CHANGE UP (ref 0.2–1.2)
BUN SERPL-MCNC: 18 MG/DL — SIGNIFICANT CHANGE UP (ref 7–23)
CA-I BLDA-SCNC: 1.23 MMOL/L — SIGNIFICANT CHANGE UP (ref 1.15–1.29)
CALCIUM SERPL-MCNC: 8 MG/DL — LOW (ref 8.4–10.5)
CHLORIDE SERPL-SCNC: 105 MMOL/L — SIGNIFICANT CHANGE UP (ref 98–107)
CO2 SERPL-SCNC: 20 MMOL/L — LOW (ref 22–31)
CREAT SERPL-MCNC: 0.74 MG/DL — SIGNIFICANT CHANGE UP (ref 0.5–1.3)
EOSINOPHIL # BLD AUTO: 0 K/UL — SIGNIFICANT CHANGE UP (ref 0–0.5)
EOSINOPHIL NFR BLD AUTO: 0 % — SIGNIFICANT CHANGE UP (ref 0–6)
GLUCOSE BLDA-MCNC: 328 MG/DL — HIGH (ref 70–99)
GLUCOSE BLDC GLUCOMTR-MCNC: 382 MG/DL — HIGH (ref 70–99)
GLUCOSE BLDC GLUCOMTR-MCNC: 383 MG/DL — HIGH (ref 70–99)
GLUCOSE SERPL-MCNC: 328 MG/DL — HIGH (ref 70–99)
HBA1C BLD-MCNC: 6.7 % — HIGH (ref 4–5.6)
HCO3 BLDA-SCNC: 22 MMOL/L — SIGNIFICANT CHANGE UP (ref 22–26)
HCT VFR BLD CALC: 28.2 % — LOW (ref 34.5–45)
HCT VFR BLDA CALC: 29.6 % — LOW (ref 34.5–46.5)
HGB BLD-MCNC: 9.1 G/DL — LOW (ref 11.5–15.5)
HGB BLDA-MCNC: 9.5 G/DL — LOW (ref 11.5–15.5)
IMM GRANULOCYTES # BLD AUTO: 0.17 # — SIGNIFICANT CHANGE UP
IMM GRANULOCYTES NFR BLD AUTO: 1.7 % — HIGH (ref 0–1.5)
LACTATE BLDA-SCNC: 2.2 MMOL/L — HIGH (ref 0.5–2)
LYMPHOCYTES # BLD AUTO: 1.48 K/UL — SIGNIFICANT CHANGE UP (ref 1–3.3)
LYMPHOCYTES # BLD AUTO: 15 % — SIGNIFICANT CHANGE UP (ref 13–44)
MAGNESIUM SERPL-MCNC: 2.3 MG/DL — SIGNIFICANT CHANGE UP (ref 1.6–2.6)
MCHC RBC-ENTMCNC: 32.3 % — SIGNIFICANT CHANGE UP (ref 32–36)
MCHC RBC-ENTMCNC: 33 PG — SIGNIFICANT CHANGE UP (ref 27–34)
MCV RBC AUTO: 102.2 FL — HIGH (ref 80–100)
MONOCYTES # BLD AUTO: 0.64 K/UL — SIGNIFICANT CHANGE UP (ref 0–0.9)
MONOCYTES NFR BLD AUTO: 6.5 % — SIGNIFICANT CHANGE UP (ref 2–14)
NEUTROPHILS # BLD AUTO: 7.53 K/UL — HIGH (ref 1.8–7.4)
NEUTROPHILS NFR BLD AUTO: 76.6 % — SIGNIFICANT CHANGE UP (ref 43–77)
NRBC # FLD: 0.05 — SIGNIFICANT CHANGE UP
PCO2 BLDA: 46 MMHG — SIGNIFICANT CHANGE UP (ref 32–48)
PH BLDA: 7.32 PH — LOW (ref 7.35–7.45)
PHOSPHATE SERPL-MCNC: 2.9 MG/DL — SIGNIFICANT CHANGE UP (ref 2.5–4.5)
PLATELET # BLD AUTO: 114 K/UL — LOW (ref 150–400)
PMV BLD: 11.7 FL — SIGNIFICANT CHANGE UP (ref 7–13)
PO2 BLDA: 165 MMHG — HIGH (ref 83–108)
POTASSIUM BLDA-SCNC: 3.6 MMOL/L — SIGNIFICANT CHANGE UP (ref 3.4–4.5)
POTASSIUM SERPL-MCNC: 4 MMOL/L — SIGNIFICANT CHANGE UP (ref 3.5–5.3)
POTASSIUM SERPL-SCNC: 4 MMOL/L — SIGNIFICANT CHANGE UP (ref 3.5–5.3)
PROT SERPL-MCNC: 5.3 G/DL — LOW (ref 6–8.3)
RBC # BLD: 2.76 M/UL — LOW (ref 3.8–5.2)
RBC # FLD: 21.2 % — HIGH (ref 10.3–14.5)
SAO2 % BLDA: 98.9 % — SIGNIFICANT CHANGE UP (ref 95–99)
SODIUM BLDA-SCNC: 138 MMOL/L — SIGNIFICANT CHANGE UP (ref 136–146)
SODIUM SERPL-SCNC: 139 MMOL/L — SIGNIFICANT CHANGE UP (ref 135–145)
WBC # BLD: 9.84 K/UL — SIGNIFICANT CHANGE UP (ref 3.8–10.5)
WBC # FLD AUTO: 9.84 K/UL — SIGNIFICANT CHANGE UP (ref 3.8–10.5)

## 2018-09-01 PROCEDURE — 71045 X-RAY EXAM CHEST 1 VIEW: CPT | Mod: 26

## 2018-09-01 PROCEDURE — 99291 CRITICAL CARE FIRST HOUR: CPT

## 2018-09-01 RX ORDER — SODIUM CHLORIDE 9 MG/ML
500 INJECTION INTRAMUSCULAR; INTRAVENOUS; SUBCUTANEOUS ONCE
Qty: 0 | Refills: 0 | Status: COMPLETED | OUTPATIENT
Start: 2018-09-01 | End: 2018-09-01

## 2018-09-01 RX ORDER — FENTANYL CITRATE 50 UG/ML
100 INJECTION INTRAVENOUS ONCE
Qty: 0 | Refills: 0 | Status: DISCONTINUED | OUTPATIENT
Start: 2018-09-01 | End: 2018-09-01

## 2018-09-01 RX ORDER — PHENYLEPHRINE HYDROCHLORIDE 10 MG/ML
0.1 INJECTION INTRAVENOUS
Qty: 160 | Refills: 0 | Status: DISCONTINUED | OUTPATIENT
Start: 2018-09-01 | End: 2018-09-02

## 2018-09-01 RX ORDER — CHLORHEXIDINE GLUCONATE 213 G/1000ML
15 SOLUTION TOPICAL
Qty: 0 | Refills: 0 | Status: DISCONTINUED | OUTPATIENT
Start: 2018-09-01 | End: 2018-09-03

## 2018-09-01 RX ORDER — CISATRACURIUM BESYLATE 2 MG/ML
3 INJECTION INTRAVENOUS
Qty: 200 | Refills: 0 | Status: DISCONTINUED | OUTPATIENT
Start: 2018-09-01 | End: 2018-09-03

## 2018-09-01 RX ORDER — FENTANYL CITRATE 50 UG/ML
0.5 INJECTION INTRAVENOUS
Qty: 2500 | Refills: 0 | Status: DISCONTINUED | OUTPATIENT
Start: 2018-09-01 | End: 2018-09-03

## 2018-09-01 RX ADMIN — Medication 10: at 18:20

## 2018-09-01 RX ADMIN — SODIUM CHLORIDE 1000 MILLILITER(S): 9 INJECTION INTRAMUSCULAR; INTRAVENOUS; SUBCUTANEOUS at 22:15

## 2018-09-01 RX ADMIN — FENTANYL CITRATE 100 MICROGRAM(S): 50 INJECTION INTRAVENOUS at 16:16

## 2018-09-01 RX ADMIN — Medication 60 MILLIGRAM(S): at 08:41

## 2018-09-01 RX ADMIN — CHLORHEXIDINE GLUCONATE 15 MILLILITER(S): 213 SOLUTION TOPICAL at 18:11

## 2018-09-01 RX ADMIN — Medication 8: at 05:38

## 2018-09-01 RX ADMIN — CISATRACURIUM BESYLATE 13.39 MICROGRAM(S)/KG/MIN: 2 INJECTION INTRAVENOUS at 07:52

## 2018-09-01 RX ADMIN — ALBUTEROL 2 PUFF(S): 90 AEROSOL, METERED ORAL at 09:38

## 2018-09-01 RX ADMIN — Medication 10: at 12:25

## 2018-09-01 RX ADMIN — Medication 3.49 MICROGRAM(S)/KG/MIN: at 07:51

## 2018-09-01 RX ADMIN — ALBUTEROL 2 PUFF(S): 90 AEROSOL, METERED ORAL at 16:25

## 2018-09-01 RX ADMIN — CHLORHEXIDINE GLUCONATE 1 APPLICATION(S): 213 SOLUTION TOPICAL at 07:52

## 2018-09-01 RX ADMIN — Medication 1 PUFF(S): at 09:39

## 2018-09-01 RX ADMIN — Medication 60 MILLIGRAM(S): at 21:19

## 2018-09-01 RX ADMIN — ALBUTEROL 2 PUFF(S): 90 AEROSOL, METERED ORAL at 21:53

## 2018-09-01 RX ADMIN — SODIUM CHLORIDE 1000 MILLILITER(S): 9 INJECTION INTRAMUSCULAR; INTRAVENOUS; SUBCUTANEOUS at 21:47

## 2018-09-01 RX ADMIN — CHLORHEXIDINE GLUCONATE 15 MILLILITER(S): 213 SOLUTION TOPICAL at 05:04

## 2018-09-01 RX ADMIN — Medication 1 PUFF(S): at 21:55

## 2018-09-01 RX ADMIN — Medication 1 PUFF(S): at 16:25

## 2018-09-01 RX ADMIN — CISATRACURIUM BESYLATE 13.39 MICROGRAM(S)/KG/MIN: 2 INJECTION INTRAVENOUS at 16:49

## 2018-09-01 RX ADMIN — ALBUTEROL 2 PUFF(S): 90 AEROSOL, METERED ORAL at 04:32

## 2018-09-01 RX ADMIN — Medication 1 DROP(S): at 12:25

## 2018-09-01 RX ADMIN — FENTANYL CITRATE 3.72 MICROGRAM(S)/KG/HR: 50 INJECTION INTRAVENOUS at 15:49

## 2018-09-01 RX ADMIN — ALBUTEROL 2 PUFF(S): 90 AEROSOL, METERED ORAL at 00:33

## 2018-09-01 RX ADMIN — Medication 1 PUFF(S): at 03:15

## 2018-09-01 RX ADMIN — ALBUTEROL 2 PUFF(S): 90 AEROSOL, METERED ORAL at 13:06

## 2018-09-01 RX ADMIN — Medication 60 MILLIGRAM(S): at 05:03

## 2018-09-01 RX ADMIN — FENTANYL CITRATE 100 MICROGRAM(S): 50 INJECTION INTRAVENOUS at 15:48

## 2018-09-01 RX ADMIN — ENOXAPARIN SODIUM 40 MILLIGRAM(S): 100 INJECTION SUBCUTANEOUS at 12:25

## 2018-09-01 RX ADMIN — PROPOFOL 21 MICROGRAM(S)/KG/MIN: 10 INJECTION, EMULSION INTRAVENOUS at 07:51

## 2018-09-01 RX ADMIN — Medication 8: at 00:03

## 2018-09-01 RX ADMIN — Medication 60 MILLIGRAM(S): at 16:12

## 2018-09-01 RX ADMIN — Medication 1 PUFF(S): at 13:06

## 2018-09-01 NOTE — PROGRESS NOTE ADULT - SUBJECTIVE AND OBJECTIVE BOX
CHIEF COMPLAINT:    Interval Events:    REVIEW OF SYSTEMS:  Constitutional: [ ] negative [ ] fevers [ ] chills [ ] weight loss [ ] weight gain  HEENT: [ ] negative [ ] dry eyes [ ] eye irritation [ ] postnasal drip [ ] nasal congestion  CV: [ ] negative  [ ] chest pain [ ] orthopnea [ ] palpitations [ ] murmur  Resp: [ ] negative [ ] cough [ ] shortness of breath [ ] dyspnea [ ] wheezing [ ] sputum [ ] hemoptysis  GI: [ ] negative [ ] nausea [ ] vomiting [ ] diarrhea [ ] constipation [ ] abd pain [ ] dysphagia   : [ ] negative [ ] dysuria [ ] nocturia [ ] hematuria [ ] increased urinary frequency  Musculoskeletal: [ ] negative [ ] back pain [ ] myalgias [ ] arthralgias [ ] fracture  Skin: [ ] negative [ ] rash [ ] itch  Neurological: [ ] negative [ ] headache [ ] dizziness [ ] syncope [ ] weakness [ ] numbness  Psychiatric: [ ] negative [ ] anxiety [ ] depression  Endocrine: [ ] negative [ ] diabetes [ ] thyroid problem  Hematologic/Lymphatic: [ ] negative [ ] anemia [ ] bleeding problem  Allergic/Immunologic: [ ] negative [ ] itchy eyes [ ] nasal discharge [ ] hives [ ] angioedema  [ ] All other systems negative  [ ] Unable to assess ROS because ________    OBJECTIVE:  ICU Vital Signs Last 24 Hrs  T(C): 36.1 (01 Sep 2018 04:00), Max: 36.7 (31 Aug 2018 08:00)  T(F): 97 (01 Sep 2018 04:00), Max: 98.1 (31 Aug 2018 08:00)  HR: 84 (01 Sep 2018 06:46) (56 - 110)  BP: 138/76 (31 Aug 2018 14:00) (108/55 - 142/90)  BP(mean): 90 (31 Aug 2018 14:00) (69 - 101)  ABP: 89/46 (01 Sep 2018 06:00) (89/46 - 147/86)  ABP(mean): 60 (01 Sep 2018 06:00) (60 - 109)  RR: 12 (01 Sep 2018 06:00) (12 - 12)  SpO2: 100% (01 Sep 2018 06:46) (99% - 100%)    Mode: AC/ CMV (Assist Control/ Continuous Mandatory Ventilation), RR (machine): 12, TV (machine): 400, FiO2: 40, PEEP: 5, MAP: 8, PIP: 34    08-31 @ 07:01  -  09-01 @ 07:00  --------------------------------------------------------  IN: 1682.6 mL / OUT: 1595 mL / NET: 87.6 mL      CAPILLARY BLOOD GLUCOSE      POCT Blood Glucose.: 311 mg/dL (01 Sep 2018 05:07)      PHYSICAL EXAM:  General:   HEENT:   Lymph Nodes:  Neck:   Respiratory:   Cardiovascular:   Abdomen:   Extremities:   Skin:   Neurological:  Psychiatry:    LINES:    HOSPITAL MEDICATIONS:  enoxaparin Injectable 40 milliGRAM(s) SubCutaneous every 24 hours      norepinephrine Infusion 0.05 MICROgram(s)/kG/Min IV Continuous <Continuous>    dextrose 40% Gel 15 Gram(s) Oral once PRN  dextrose 50% Injectable 12.5 Gram(s) IV Push once  dextrose 50% Injectable 25 Gram(s) IV Push once  dextrose 50% Injectable 25 Gram(s) IV Push once  glucagon  Injectable 1 milliGRAM(s) IntraMuscular once PRN  insulin lispro (HumaLOG) corrective regimen sliding scale   SubCutaneous every 6 hours  methylPREDNISolone sodium succinate Injectable 60 milliGRAM(s) IV Push every 6 hours    ALBUTerol    90 MICROgram(s) HFA Inhaler 2 Puff(s) Inhalation every 4 hours  ipratropium 17 MICROgram(s) HFA Inhaler 1 Puff(s) Inhalation every 6 hours  ipratropium 17 MICROgram(s) HFA Inhaler 1 Puff(s) Inhalation once    cisatracurium Infusion 3 MICROgram(s)/kG/Min IV Continuous <Continuous>  propofol Infusion 50 MICROgram(s)/kG/Min IV Continuous <Continuous>          dextrose 5%. 1000 milliLiter(s) IV Continuous <Continuous>      artificial  tears Solution 1 Drop(s) Both EYES daily  chlorhexidine 0.12% Liquid 15 milliLiter(s) Swish and Spit two times a day  chlorhexidine 4% Liquid 1 Application(s) Topical <User Schedule>        LABS:                        9.1    9.84  )-----------( 114      ( 01 Sep 2018 03:10 )             28.2     Hgb Trend: 9.1<--, 10.0<--, 11.0<--  09-01    139  |  105  |  18  ----------------------------<  328<H>  4.0   |  20<L>  |  0.74    Ca    8.0<L>      01 Sep 2018 03:10  Phos  2.9     09-01  Mg     2.3     09-01    TPro  5.3<L>  /  Alb  2.6<L>  /  TBili  0.6  /  DBili  x   /  AST  74<H>  /  ALT  38<H>  /  AlkPhos  107  09-01    Creatinine Trend: 0.74<--, 0.81<--, 0.63<--      Arterial Blood Gas:  09-01 @ 02:50  7.32/46/165/22/98.9/-2.5  ABG lactate: 2.2  Arterial Blood Gas:  08-31 @ 15:05  7.30/45/167/21/98.9/-3.7  ABG lactate: 1.8  Arterial Blood Gas:  08-31 @ 10:00  7.29/43/164/20/98.9/-5.1  ABG lactate: --  Arterial Blood Gas:  08-31 @ 06:25  7.21/51/151/18/98.2/-6.8  ABG lactate: 3.3    Venous Blood Gas:  08-31 @ 02:30  7.32/45/36/21/60.9  VBG Lactate: 2.4  Venous Blood Gas:  08-30 @ 22:40  7.30/47/48/21/77.9  VBG Lactate: 3.7      MICROBIOLOGY:     RADIOLOGY:  [ ] Reviewed and interpreted by me    EKG: Overnight events:   No acute events overnight. Patient is intubated and sedated this AM.       OBJECTIVE:  ICU Vital Signs Last 24 Hrs  T(C): 36.1 (01 Sep 2018 04:00), Max: 36.7 (31 Aug 2018 08:00)  T(F): 97 (01 Sep 2018 04:00), Max: 98.1 (31 Aug 2018 08:00)  HR: 84 (01 Sep 2018 06:46) (56 - 110)  BP: 138/76 (31 Aug 2018 14:00) (108/55 - 142/90)  BP(mean): 90 (31 Aug 2018 14:00) (69 - 101)  ABP: 89/46 (01 Sep 2018 06:00) (89/46 - 147/86)  ABP(mean): 60 (01 Sep 2018 06:00) (60 - 109)  RR: 12 (01 Sep 2018 06:00) (12 - 12)  SpO2: 100% (01 Sep 2018 06:46) (99% - 100%)    Mode: AC/ CMV (Assist Control/ Continuous Mandatory Ventilation), RR (machine): 12, TV (machine): 400, FiO2: 40, PEEP: 5, MAP: 8, PIP: 34    08-31 @ 07:01  -  09-01 @ 07:00  --------------------------------------------------------  IN: 1682.6 mL / OUT: 1595 mL / NET: 87.6 mL      CAPILLARY BLOOD GLUCOSE      POCT Blood Glucose.: 311 mg/dL (01 Sep 2018 05:07)      PHYSICAL EXAM:  	GENERAL: intubated, sedated.   	HEENT: pupils equal & reactive, no scleral icterus  	HEART: S1, S2. No murmurs noted.     LUNGS: Diffuse rhonchi heard in bilateral lung fields with occasional wheezes   	ABDOMEN: soft, nondistended.   	NEURO: sedated.   EXT: No LE edema      LINES:    HOSPITAL MEDICATIONS:  enoxaparin Injectable 40 milliGRAM(s) SubCutaneous every 24 hours  norepinephrine Infusion 0.05 MICROgram(s)/kG/Min IV Continuous <Continuous>  dextrose 40% Gel 15 Gram(s) Oral once PRN  dextrose 50% Injectable 12.5 Gram(s) IV Push once  dextrose 50% Injectable 25 Gram(s) IV Push once  dextrose 50% Injectable 25 Gram(s) IV Push once  glucagon  Injectable 1 milliGRAM(s) IntraMuscular once PRN  insulin lispro (HumaLOG) corrective regimen sliding scale   SubCutaneous every 6 hours  methylPREDNISolone sodium succinate Injectable 60 milliGRAM(s) IV Push every 6 hours  ALBUTerol    90 MICROgram(s) HFA Inhaler 2 Puff(s) Inhalation every 4 hours  ipratropium 17 MICROgram(s) HFA Inhaler 1 Puff(s) Inhalation every 6 hours  ipratropium 17 MICROgram(s) HFA Inhaler 1 Puff(s) Inhalation once  cisatracurium Infusion 3 MICROgram(s)/kG/Min IV Continuous <Continuous>  propofol Infusion 50 MICROgram(s)/kG/Min IV Continuous <Continuous>  dextrose 5%. 1000 milliLiter(s) IV Continuous <Continuous>  artificial  tears Solution 1 Drop(s) Both EYES daily  chlorhexidine 0.12% Liquid 15 milliLiter(s) Swish and Spit two times a day  chlorhexidine 4% Liquid 1 Application(s) Topical <User Schedule>        LABS:                        9.1    9.84  )-----------( 114      ( 01 Sep 2018 03:10 )             28.2     Hgb Trend: 9.1<--, 10.0<--, 11.0<--  09-01    139  |  105  |  18  ----------------------------<  328<H>  4.0   |  20<L>  |  0.74    Ca    8.0<L>      01 Sep 2018 03:10  Phos  2.9     09-01  Mg     2.3     09-01    TPro  5.3<L>  /  Alb  2.6<L>  /  TBili  0.6  /  DBili  x   /  AST  74<H>  /  ALT  38<H>  /  AlkPhos  107  09-01    Creatinine Trend: 0.74<--, 0.81<--, 0.63<--      Arterial Blood Gas:  09-01 @ 02:50  7.32/46/165/22/98.9/-2.5  ABG lactate: 2.2  Arterial Blood Gas:  08-31 @ 15:05  7.30/45/167/21/98.9/-3.7  ABG lactate: 1.8  Arterial Blood Gas:  08-31 @ 10:00  7.29/43/164/20/98.9/-5.1  ABG lactate: --  Arterial Blood Gas:  08-31 @ 06:25  7.21/51/151/18/98.2/-6.8  ABG lactate: 3.3    Venous Blood Gas:  08-31 @ 02:30  7.32/45/36/21/60.9  VBG Lactate: 2.4  Venous Blood Gas:  08-30 @ 22:40  7.30/47/48/21/77.9  VBG Lactate: 3.7

## 2018-09-01 NOTE — PROGRESS NOTE ADULT - ASSESSMENT
78F with COPD, asthma, GERD, HLD, HTN, spinal stenosis w/ hardware, colon CA (dx. 02/2018 s/p colon resection in 03/2018) presents with SOB and severe COPD exacerbation requiring intubation in the setting of positive hMPV.     # Neuro:   - propofol for sedation with daily sedation vacation  - D/C Nimbex gtt for paralysis      # CV: Severe LV systolic dysfunction on POCUS   - history of CAD unclear, will clarify with PMD and daughter  - possibly Takotsubo/stress-induced cardiomyopathy  - Troponins trended down, no need to trend further.   Hypotension likely due to propofol vs. stress-induced cardiomyopathy  - on Levophed, titrate to MAP >65    # Pulm: Hypercapneic respiratory failure with COPD exacerbation, emergently intubated and paralyzed to optimize ventilation   - likely secondary to hMPV respiratory infection  - continues to have diffuse wheezes/rhonchi on exam. Continue Albuterol/Atrovent inhaler Q4.   - Decreased lung sliding noted on bedside echo. CXR negative for pneumnothorax.   - continue Solu-Medrol 60 q6    # ID: Sepsis secondary to hMPV respiratory infection  - monitor off antibiotics given CXR with clear lungs   - s/p 3L IVF resuscitation in ED     # Onc: Colon cancer s/p resection in 03/2018 on chemotherapy  - on oxaliplatin therapy (last given 8/20) and Xeloda (last given 8/25)  - need to contact outpatient Oncologist Dr. Juancarlos Yepez ([phone])    # Renal: serum Cr at baseline     # DVT ppx: Lovenox 40 subQ 78F with COPD, asthma, GERD, HLD, HTN, spinal stenosis w/ hardware, colon CA (dx. 02/2018 s/p colon resection in 03/2018) presents with SOB and severe COPD exacerbation requiring intubation in the setting of positive hMPV.     # Neuro:   - propofol for sedation with daily sedation vacation  - D/C Nimbex gtt  - Start fentanyl gtt      # CV: Severe LV systolic dysfunction on POCUS   - history of CAD unclear, will clarify with PMD and daughter  - possibly Takotsubo/stress-induced cardiomyopathy  - Troponins trended down, no need to trend further.   Hypotension likely due to propofol vs. stress-induced cardiomyopathy  - on Levophed, titrate to MAP >65    # Pulm: Hypercapneic respiratory failure with COPD exacerbation, emergently intubated and paralyzed to optimize ventilation   - likely secondary to hMPV respiratory infection  - continues to have diffuse wheezes/rhonchi on exam. Continue Albuterol/Atrovent inhaler Q4.   - Decreased lung sliding noted on bedside echo. CXR negative for pneumnothorax.   - continue Solu-Medrol 60 q6    # ID: Sepsis secondary to hMPV respiratory infection  - monitor off antibiotics given CXR with clear lungs   - s/p 3L IVF resuscitation in ED     # Onc: Colon cancer s/p resection in 03/2018 on chemotherapy  - on oxaliplatin therapy (last given 8/20) and Xeloda (last given 8/25)  - outpatient Oncologist Dr. Juancarlos Yepez ([phone])    # Renal: serum Cr at baseline     # DVT ppx: Lovenox 40 subQ    Gulshan Uribe MD PGY I  Department of Internal Medicine

## 2018-09-01 NOTE — PROGRESS NOTE ADULT - ATTENDING COMMENTS
Ms. Conde is a 78 year old female with COPD, asthma, who presents with SOB and COPD exacerbation requiring intubation in the setting of hMPV. Attempted to take off paralytics and keep on deep sedation with propofol and fenatnyl but she continued to autopeep so placed back on nimbex. Aggressive PT (metaneb on Sunday), steroids, and duonebs Q4. Supporitive care. Family at bedside and condition explained. Ms. Conde is a 78 year old female with COPD, asthma, who presents with SOB and COPD exacerbation requiring intubation in the setting of hMPV. Attempted to take off paralytics and keep on deep sedation with propofol and fenatnyl but she continued to autopeep so placed back on nimbex. Aggressive PT (metaneb on Sunday), steroids, and duonebs Q4. Supporitive care. Ultrasound continues to show decreased LV function suggestive of a cardiomyopathy. Family at bedside and condition explained.

## 2018-09-02 LAB
ANISOCYTOSIS BLD QL: SIGNIFICANT CHANGE UP
BASE EXCESS BLDA CALC-SCNC: -4.1 MMOL/L — SIGNIFICANT CHANGE UP
BASE EXCESS BLDA CALC-SCNC: -4.2 MMOL/L — SIGNIFICANT CHANGE UP
BASE EXCESS BLDA CALC-SCNC: -6.2 MMOL/L — SIGNIFICANT CHANGE UP
BASE EXCESS BLDA CALC-SCNC: -7.6 MMOL/L — SIGNIFICANT CHANGE UP
BASOPHILS # BLD AUTO: 0.01 K/UL — SIGNIFICANT CHANGE UP (ref 0–0.2)
BASOPHILS NFR BLD AUTO: 0.1 % — SIGNIFICANT CHANGE UP (ref 0–2)
BASOPHILS NFR SPEC: 0 % — SIGNIFICANT CHANGE UP (ref 0–2)
BLASTS # FLD: 0 % — SIGNIFICANT CHANGE UP (ref 0–0)
BUN SERPL-MCNC: 26 MG/DL — HIGH (ref 7–23)
C DIFF TOX GENS STL QL NAA+PROBE: SIGNIFICANT CHANGE UP
CALCIUM SERPL-MCNC: 8 MG/DL — LOW (ref 8.4–10.5)
CHLORIDE BLDA-SCNC: 111 MMOL/L — HIGH (ref 96–108)
CHLORIDE BLDA-SCNC: 112 MMOL/L — HIGH (ref 96–108)
CHLORIDE SERPL-SCNC: 106 MMOL/L — SIGNIFICANT CHANGE UP (ref 98–107)
CO2 SERPL-SCNC: 21 MMOL/L — LOW (ref 22–31)
CREAT BLDA-MCNC: 0.59 MG/DL — SIGNIFICANT CHANGE UP (ref 0.5–1.3)
CREAT SERPL-MCNC: 0.83 MG/DL — SIGNIFICANT CHANGE UP (ref 0.5–1.3)
ELLIPTOCYTES BLD QL SMEAR: SLIGHT — SIGNIFICANT CHANGE UP
EOSINOPHIL # BLD AUTO: 0 K/UL — SIGNIFICANT CHANGE UP (ref 0–0.5)
EOSINOPHIL NFR BLD AUTO: 0 % — SIGNIFICANT CHANGE UP (ref 0–6)
EOSINOPHIL NFR FLD: 0 % — SIGNIFICANT CHANGE UP (ref 0–6)
GIANT PLATELETS BLD QL SMEAR: PRESENT — SIGNIFICANT CHANGE UP
GLUCOSE BLDA-MCNC: 321 MG/DL — HIGH (ref 70–99)
GLUCOSE BLDA-MCNC: 351 MG/DL — HIGH (ref 70–99)
GLUCOSE BLDA-MCNC: 360 MG/DL — HIGH (ref 70–99)
GLUCOSE BLDA-MCNC: 370 MG/DL — HIGH (ref 70–99)
GLUCOSE BLDC GLUCOMTR-MCNC: 322 MG/DL — HIGH (ref 70–99)
GLUCOSE BLDC GLUCOMTR-MCNC: 339 MG/DL — HIGH (ref 70–99)
GLUCOSE BLDC GLUCOMTR-MCNC: 352 MG/DL — HIGH (ref 70–99)
GLUCOSE BLDC GLUCOMTR-MCNC: 367 MG/DL — HIGH (ref 70–99)
GLUCOSE BLDC GLUCOMTR-MCNC: 376 MG/DL — HIGH (ref 70–99)
GLUCOSE SERPL-MCNC: 383 MG/DL — HIGH (ref 70–99)
HCO3 BLDA-SCNC: 18 MMOL/L — LOW (ref 22–26)
HCO3 BLDA-SCNC: 19 MMOL/L — LOW (ref 22–26)
HCO3 BLDA-SCNC: 20 MMOL/L — LOW (ref 22–26)
HCO3 BLDA-SCNC: 20 MMOL/L — LOW (ref 22–26)
HCT VFR BLD CALC: 28.3 % — LOW (ref 34.5–45)
HCT VFR BLDA CALC: 27.8 % — LOW (ref 34.5–46.5)
HCT VFR BLDA CALC: 28.7 % — LOW (ref 34.5–46.5)
HCT VFR BLDA CALC: 28.8 % — LOW (ref 34.5–46.5)
HCT VFR BLDA CALC: 30.9 % — LOW (ref 34.5–46.5)
HGB BLD-MCNC: 9 G/DL — LOW (ref 11.5–15.5)
HGB BLDA-MCNC: 10 G/DL — LOW (ref 11.5–15.5)
HGB BLDA-MCNC: 9 G/DL — LOW (ref 11.5–15.5)
HGB BLDA-MCNC: 9.3 G/DL — LOW (ref 11.5–15.5)
HGB BLDA-MCNC: 9.3 G/DL — LOW (ref 11.5–15.5)
IMM GRANULOCYTES # BLD AUTO: 0.73 # — SIGNIFICANT CHANGE UP
IMM GRANULOCYTES NFR BLD AUTO: 7.3 % — HIGH (ref 0–1.5)
LACTATE BLDA-SCNC: 2.3 MMOL/L — HIGH (ref 0.5–2)
LACTATE BLDA-SCNC: 2.4 MMOL/L — HIGH (ref 0.5–2)
LYMPHOCYTES # BLD AUTO: 0.77 K/UL — LOW (ref 1–3.3)
LYMPHOCYTES # BLD AUTO: 7.7 % — LOW (ref 13–44)
LYMPHOCYTES NFR SPEC AUTO: 3.5 % — LOW (ref 13–44)
MACROCYTES BLD QL: SIGNIFICANT CHANGE UP
MAGNESIUM SERPL-MCNC: 2.1 MG/DL — SIGNIFICANT CHANGE UP (ref 1.6–2.6)
MCHC RBC-ENTMCNC: 31.8 % — LOW (ref 32–36)
MCHC RBC-ENTMCNC: 34 PG — SIGNIFICANT CHANGE UP (ref 27–34)
MCV RBC AUTO: 106.8 FL — HIGH (ref 80–100)
METAMYELOCYTES # FLD: 0 % — SIGNIFICANT CHANGE UP (ref 0–1)
MONOCYTES # BLD AUTO: 0.8 K/UL — SIGNIFICANT CHANGE UP (ref 0–0.9)
MONOCYTES NFR BLD AUTO: 8 % — SIGNIFICANT CHANGE UP (ref 2–14)
MONOCYTES NFR BLD: 1.8 % — LOW (ref 2–9)
MYELOCYTES NFR BLD: 0.9 % — HIGH (ref 0–0)
NEUTROPHIL AB SER-ACNC: 82.3 % — HIGH (ref 43–77)
NEUTROPHILS # BLD AUTO: 7.68 K/UL — HIGH (ref 1.8–7.4)
NEUTROPHILS NFR BLD AUTO: 76.9 % — SIGNIFICANT CHANGE UP (ref 43–77)
NEUTS BAND # BLD: 6.2 % — HIGH (ref 0–6)
NRBC # FLD: 0.14 — SIGNIFICANT CHANGE UP
NRBC FLD-RTO: 1.4 — SIGNIFICANT CHANGE UP
OTHER - HEMATOLOGY %: 0 — SIGNIFICANT CHANGE UP
OVALOCYTES BLD QL SMEAR: SLIGHT — SIGNIFICANT CHANGE UP
PCO2 BLDA: 59 MMHG — HIGH (ref 32–48)
PCO2 BLDA: 59 MMHG — HIGH (ref 32–48)
PCO2 BLDA: 60 MMHG — HIGH (ref 32–48)
PCO2 BLDA: 67 MMHG — HIGH (ref 32–48)
PH BLDA: 7.15 PH — CRITICAL LOW (ref 7.35–7.45)
PH BLDA: 7.17 PH — CRITICAL LOW (ref 7.35–7.45)
PH BLDA: 7.18 PH — CRITICAL LOW (ref 7.35–7.45)
PH BLDA: 7.2 PH — CRITICAL LOW (ref 7.35–7.45)
PHOSPHATE SERPL-MCNC: 3 MG/DL — SIGNIFICANT CHANGE UP (ref 2.5–4.5)
PLATELET # BLD AUTO: 95 K/UL — LOW (ref 150–400)
PLATELET COUNT - ESTIMATE: SIGNIFICANT CHANGE UP
PMV BLD: 10.8 FL — SIGNIFICANT CHANGE UP (ref 7–13)
PO2 BLDA: 111 MMHG — HIGH (ref 83–108)
PO2 BLDA: 121 MMHG — HIGH (ref 83–108)
PO2 BLDA: 87 MMHG — SIGNIFICANT CHANGE UP (ref 83–108)
PO2 BLDA: 95 MMHG — SIGNIFICANT CHANGE UP (ref 83–108)
POIKILOCYTOSIS BLD QL AUTO: SLIGHT — SIGNIFICANT CHANGE UP
POLYCHROMASIA BLD QL SMEAR: SLIGHT — SIGNIFICANT CHANGE UP
POTASSIUM BLDA-SCNC: 3.9 MMOL/L — SIGNIFICANT CHANGE UP (ref 3.4–4.5)
POTASSIUM BLDA-SCNC: 4.2 MMOL/L — SIGNIFICANT CHANGE UP (ref 3.4–4.5)
POTASSIUM BLDA-SCNC: 4.4 MMOL/L — SIGNIFICANT CHANGE UP (ref 3.4–4.5)
POTASSIUM BLDA-SCNC: 4.6 MMOL/L — HIGH (ref 3.4–4.5)
POTASSIUM SERPL-MCNC: 4.3 MMOL/L — SIGNIFICANT CHANGE UP (ref 3.5–5.3)
POTASSIUM SERPL-SCNC: 4.3 MMOL/L — SIGNIFICANT CHANGE UP (ref 3.5–5.3)
PROMYELOCYTES # FLD: 0 % — SIGNIFICANT CHANGE UP (ref 0–0)
RBC # BLD: 2.65 M/UL — LOW (ref 3.8–5.2)
RBC # FLD: 21.4 % — HIGH (ref 10.3–14.5)
SAO2 % BLDA: 95.1 % — SIGNIFICANT CHANGE UP (ref 95–99)
SAO2 % BLDA: 96 % — SIGNIFICANT CHANGE UP (ref 95–99)
SAO2 % BLDA: 96.6 % — SIGNIFICANT CHANGE UP (ref 95–99)
SAO2 % BLDA: 98 % — SIGNIFICANT CHANGE UP (ref 95–99)
SMUDGE CELLS # BLD: PRESENT — SIGNIFICANT CHANGE UP
SODIUM BLDA-SCNC: 135 MMOL/L — LOW (ref 136–146)
SODIUM BLDA-SCNC: 136 MMOL/L — SIGNIFICANT CHANGE UP (ref 136–146)
SODIUM SERPL-SCNC: 139 MMOL/L — SIGNIFICANT CHANGE UP (ref 135–145)
TROPONIN T, HIGH SENSITIVITY: 173 NG/L — CRITICAL HIGH (ref ?–14)
VARIANT LYMPHS # BLD: 3.5 % — SIGNIFICANT CHANGE UP
WBC # BLD: 9.99 K/UL — SIGNIFICANT CHANGE UP (ref 3.8–10.5)
WBC # FLD AUTO: 9.99 K/UL — SIGNIFICANT CHANGE UP (ref 3.8–10.5)

## 2018-09-02 PROCEDURE — 99291 CRITICAL CARE FIRST HOUR: CPT

## 2018-09-02 RX ORDER — ASPIRIN/CALCIUM CARB/MAGNESIUM 324 MG
81 TABLET ORAL DAILY
Qty: 0 | Refills: 0 | Status: DISCONTINUED | OUTPATIENT
Start: 2018-09-02 | End: 2018-09-03

## 2018-09-02 RX ORDER — PHENYLEPHRINE HYDROCHLORIDE 10 MG/ML
0.1 INJECTION INTRAVENOUS
Qty: 160 | Refills: 0 | Status: DISCONTINUED | OUTPATIENT
Start: 2018-09-02 | End: 2018-09-03

## 2018-09-02 RX ORDER — AZITHROMYCIN 500 MG/1
500 TABLET, FILM COATED ORAL EVERY 24 HOURS
Qty: 0 | Refills: 0 | Status: DISCONTINUED | OUTPATIENT
Start: 2018-09-02 | End: 2018-09-03

## 2018-09-02 RX ORDER — SODIUM CHLORIDE 9 MG/ML
3 INJECTION INTRAMUSCULAR; INTRAVENOUS; SUBCUTANEOUS
Qty: 0 | Refills: 0 | Status: DISCONTINUED | OUTPATIENT
Start: 2018-09-02 | End: 2018-09-03

## 2018-09-02 RX ORDER — SODIUM CHLORIDE 9 MG/ML
500 INJECTION INTRAMUSCULAR; INTRAVENOUS; SUBCUTANEOUS ONCE
Qty: 0 | Refills: 0 | Status: COMPLETED | OUTPATIENT
Start: 2018-09-02 | End: 2018-09-02

## 2018-09-02 RX ORDER — ATORVASTATIN CALCIUM 80 MG/1
20 TABLET, FILM COATED ORAL AT BEDTIME
Qty: 0 | Refills: 0 | Status: DISCONTINUED | OUTPATIENT
Start: 2018-09-02 | End: 2018-09-03

## 2018-09-02 RX ORDER — SODIUM BICARBONATE 1 MEQ/ML
50 SYRINGE (ML) INTRAVENOUS ONCE
Qty: 0 | Refills: 0 | Status: COMPLETED | OUTPATIENT
Start: 2018-09-02 | End: 2018-09-02

## 2018-09-02 RX ORDER — SODIUM CHLORIDE 9 MG/ML
500 INJECTION INTRAMUSCULAR; INTRAVENOUS; SUBCUTANEOUS
Qty: 0 | Refills: 0 | Status: DISCONTINUED | OUTPATIENT
Start: 2018-09-02 | End: 2018-09-03

## 2018-09-02 RX ADMIN — Medication 60 MILLIGRAM(S): at 09:42

## 2018-09-02 RX ADMIN — CHLORHEXIDINE GLUCONATE 1 APPLICATION(S): 213 SOLUTION TOPICAL at 07:55

## 2018-09-02 RX ADMIN — Medication 60 MILLIGRAM(S): at 02:49

## 2018-09-02 RX ADMIN — Medication 40 MILLIGRAM(S): at 17:43

## 2018-09-02 RX ADMIN — ALBUTEROL 2 PUFF(S): 90 AEROSOL, METERED ORAL at 01:49

## 2018-09-02 RX ADMIN — PROPOFOL 21 MICROGRAM(S)/KG/MIN: 10 INJECTION, EMULSION INTRAVENOUS at 07:53

## 2018-09-02 RX ADMIN — CHLORHEXIDINE GLUCONATE 15 MILLILITER(S): 213 SOLUTION TOPICAL at 17:42

## 2018-09-02 RX ADMIN — Medication 10: at 06:35

## 2018-09-02 RX ADMIN — CISATRACURIUM BESYLATE 13.39 MICROGRAM(S)/KG/MIN: 2 INJECTION INTRAVENOUS at 07:55

## 2018-09-02 RX ADMIN — Medication 8: at 17:43

## 2018-09-02 RX ADMIN — ALBUTEROL 2 PUFF(S): 90 AEROSOL, METERED ORAL at 17:24

## 2018-09-02 RX ADMIN — Medication 50 MILLIEQUIVALENT(S): at 23:13

## 2018-09-02 RX ADMIN — ALBUTEROL 2 PUFF(S): 90 AEROSOL, METERED ORAL at 09:45

## 2018-09-02 RX ADMIN — SODIUM CHLORIDE 3 MILLILITER(S): 9 INJECTION INTRAMUSCULAR; INTRAVENOUS; SUBCUTANEOUS at 21:46

## 2018-09-02 RX ADMIN — ENOXAPARIN SODIUM 40 MILLIGRAM(S): 100 INJECTION SUBCUTANEOUS at 12:08

## 2018-09-02 RX ADMIN — SODIUM CHLORIDE 500 MILLILITER(S): 9 INJECTION INTRAMUSCULAR; INTRAVENOUS; SUBCUTANEOUS at 15:38

## 2018-09-02 RX ADMIN — ALBUTEROL 2 PUFF(S): 90 AEROSOL, METERED ORAL at 21:46

## 2018-09-02 RX ADMIN — Medication 81 MILLIGRAM(S): at 15:14

## 2018-09-02 RX ADMIN — Medication 1 PUFF(S): at 09:45

## 2018-09-02 RX ADMIN — ATORVASTATIN CALCIUM 20 MILLIGRAM(S): 80 TABLET, FILM COATED ORAL at 23:05

## 2018-09-02 RX ADMIN — Medication 1 PUFF(S): at 21:45

## 2018-09-02 RX ADMIN — Medication 1 PUFF(S): at 04:21

## 2018-09-02 RX ADMIN — AZITHROMYCIN 250 MILLIGRAM(S): 500 TABLET, FILM COATED ORAL at 13:02

## 2018-09-02 RX ADMIN — CHLORHEXIDINE GLUCONATE 15 MILLILITER(S): 213 SOLUTION TOPICAL at 06:35

## 2018-09-02 RX ADMIN — FENTANYL CITRATE 3.72 MICROGRAM(S)/KG/HR: 50 INJECTION INTRAVENOUS at 07:54

## 2018-09-02 RX ADMIN — ALBUTEROL 2 PUFF(S): 90 AEROSOL, METERED ORAL at 04:20

## 2018-09-02 RX ADMIN — SODIUM CHLORIDE 1000 MILLILITER(S): 9 INJECTION INTRAMUSCULAR; INTRAVENOUS; SUBCUTANEOUS at 17:45

## 2018-09-02 RX ADMIN — Medication 1 PUFF(S): at 15:08

## 2018-09-02 RX ADMIN — ALBUTEROL 2 PUFF(S): 90 AEROSOL, METERED ORAL at 13:51

## 2018-09-02 RX ADMIN — Medication 10: at 12:48

## 2018-09-02 RX ADMIN — Medication 10: at 00:10

## 2018-09-02 RX ADMIN — PHENYLEPHRINE HYDROCHLORIDE 1.4 MICROGRAM(S)/KG/MIN: 10 INJECTION INTRAVENOUS at 17:44

## 2018-09-02 RX ADMIN — Medication 1 DROP(S): at 12:08

## 2018-09-02 RX ADMIN — Medication 8: at 23:09

## 2018-09-02 NOTE — PROGRESS NOTE ADULT - ATTENDING COMMENTS
Severe COPD with hMPV infection causing hypoxic and now hypercapnic respiratory failure on MV.  Difficulty ventilating may be due to severe secretions. Adjusting vent setting, repeat abg, starting metaneb, continue steroids and BD, adding abx for copd exacerbation.

## 2018-09-02 NOTE — PROGRESS NOTE ADULT - SUBJECTIVE AND OBJECTIVE BOX
Ricco Mitchell, PGY1  MICU Team    SUBJECTIVE: Patient seen and examined at bedside.     OBJECTIVE:    VITAL SIGNS:  ICU Vital Signs Last 24 Hrs  T(C): 37.4 (02 Sep 2018 08:00), Max: 37.4 (02 Sep 2018 08:00)  T(F): 99.3 (02 Sep 2018 08:00), Max: 99.3 (02 Sep 2018 08:00)  HR: 117 (02 Sep 2018 13:53) (98 - 138)  BP: 90/47 (02 Sep 2018 13:00) (90/45 - 90/47)  BP(mean): 57 (02 Sep 2018 13:00) (56 - 57)  ABP: 100/48 (02 Sep 2018 13:00) (84/35 - 156/71)  ABP(mean): 64 (02 Sep 2018 13:00) (51 - 95)  RR: 20 (02 Sep 2018 13:00) (11 - 22)  SpO2: 100% (02 Sep 2018 13:53) (94% - 100%)    Mode: AC/ CMV (Assist Control/ Continuous Mandatory Ventilation), RR (machine): 16, TV (machine): 350, FiO2: 40, PEEP: 5, MAP: 10.9, PIP: 18    09-01 @ 07:01 - 09-02 @ 07:00  --------------------------------------------------------  IN: 3100.3 mL / OUT: 610 mL / NET: 2490.3 mL    09-02 @ 07:01 - 09-02 @ 14:07  --------------------------------------------------------  IN: 514.2 mL / OUT: 120 mL / NET: 394.2 mL      CAPILLARY BLOOD GLUCOSE      POCT Blood Glucose.: 352 mg/dL (02 Sep 2018 12:38)      PHYSICAL EXAM:      MEDICATIONS:  MEDICATIONS  (STANDING):  ALBUTerol    90 MICROgram(s) HFA Inhaler 2 Puff(s) Inhalation every 4 hours  artificial  tears Solution 1 Drop(s) Both EYES daily  aspirin  chewable 81 milliGRAM(s) Oral daily  atorvastatin 20 milliGRAM(s) Oral at bedtime  azithromycin  IVPB 500 milliGRAM(s) IV Intermittent every 24 hours  chlorhexidine 0.12% Liquid 15 milliLiter(s) Swish and Spit two times a day  chlorhexidine 4% Liquid 1 Application(s) Topical <User Schedule>  cisatracurium Infusion 3 MICROgram(s)/kG/Min (13.392 mL/Hr) IV Continuous <Continuous>  dextrose 5%. 1000 milliLiter(s) (50 mL/Hr) IV Continuous <Continuous>  dextrose 50% Injectable 12.5 Gram(s) IV Push once  dextrose 50% Injectable 25 Gram(s) IV Push once  dextrose 50% Injectable 25 Gram(s) IV Push once  enoxaparin Injectable 40 milliGRAM(s) SubCutaneous every 24 hours  fentaNYL   Infusion. 0.5 MICROgram(s)/kG/Hr (3.72 mL/Hr) IV Continuous <Continuous>  insulin lispro (HumaLOG) corrective regimen sliding scale   SubCutaneous every 6 hours  ipratropium 17 MICROgram(s) HFA Inhaler 1 Puff(s) Inhalation every 6 hours  methylPREDNISolone sodium succinate Injectable 40 milliGRAM(s) IV Push every 12 hours  phenylephrine    Infusion 0.1 MICROgram(s)/kG/Min (1.395 mL/Hr) IV Continuous <Continuous>  propofol Infusion 50 MICROgram(s)/kG/Min (21 mL/Hr) IV Continuous <Continuous>  sodium chloride 0.9%. 500 milliLiter(s) (500 mL/Hr) IV Continuous <Continuous>    MEDICATIONS  (PRN):  dextrose 40% Gel 15 Gram(s) Oral once PRN Blood Glucose LESS THAN 70 milliGRAM(s)/deciliter  glucagon  Injectable 1 milliGRAM(s) IntraMuscular once PRN Glucose LESS THAN 70 milligrams/deciliter      ALLERGIES:  Allergies    Carrots (Pruritus)  dust (Pruritus)  No Known Drug Allergies  Nuts (Pruritus)  shellfish (Pruritus)    Intolerances        LABS:                        9.0    9.99  )-----------( 95       ( 02 Sep 2018 02:32 )             28.3     CBC Full  -  ( 02 Sep 2018 02:32 )  WBC Count : 9.99 K/uL  Hemoglobin : 9.0 g/dL  Hematocrit : 28.3 %  Platelet Count - Automated : 95 K/uL  Mean Cell Volume : 106.8 fL  Mean Cell Hemoglobin : 34.0 pg  Mean Cell Hemoglobin Concentration : 31.8 %  Auto Neutrophil # : 7.68 K/uL  Auto Lymphocyte # : 0.77 K/uL  Auto Monocyte # : 0.80 K/uL  Auto Eosinophil # : 0.00 K/uL  Auto Basophil # : 0.01 K/uL  Auto Neutrophil % : 76.9 %  Auto Lymphocyte % : 7.7 %  Auto Monocyte % : 8.0 %  Auto Eosinophil % : 0.0 %  Auto Basophil % : 0.1 %    09-02    139  |  106  |  26<H>  ----------------------------<  383<H>  4.3   |  21<L>  |  0.83    Ca    8.0<L>      02 Sep 2018 02:32  Phos  3.0     09-02  Mg     2.1     09-02    TPro  5.3<L>  /  Alb  2.6<L>  /  TBili  0.6  /  DBili  x   /  AST  74<H>  /  ALT  38<H>  /  AlkPhos  107  09-01    Creatinine Trend: 0.83<--, 0.74<--, 0.81<--, 0.63<--  LIVER FUNCTIONS - ( 01 Sep 2018 03:10 )  Alb: 2.6 g/dL / Pro: 5.3 g/dL / ALK PHOS: 107 u/L / ALT: 38 u/L / AST: 74 u/L / GGT: x             CARDIAC MARKERS ( 31 Aug 2018 15:05 )  x     / x     / 295 u/L / 23.93 ng/mL / x          ABG - ( 02 Sep 2018 10:10 )  pH, Arterial: 7.20  pH, Blood: x     /  pCO2: 60    /  pO2: 95    / HCO3: 20    / Base Excess: -4.2  /  SaO2: 96.6                  MICROBIOLOGY:    Culture - Blood (collected 30 Aug 2018 23:45)  Source: BLOOD VENOUS  Preliminary Report (01 Sep 2018 23:45):    NO ORGANISMS ISOLATED    NO ORGANISMS ISOLATED AT 48 HRS.    Culture - Blood (collected 30 Aug 2018 23:45)  Source: BLOOD PERIPHERAL  Preliminary Report (01 Sep 2018 23:45):    NO ORGANISMS ISOLATED    NO ORGANISMS ISOLATED AT 48 HRS.      IMAGING:    RADIOLOGY & ADDITIONAL TESTS: Reviewed. Ricco Mitchell, PGY1  MICU Team    SUBJECTIVE: Patient seen and examined at bedside.     OBJECTIVE:    VITAL SIGNS:  ICU Vital Signs Last 24 Hrs  T(C): 37.4 (02 Sep 2018 08:00), Max: 37.4 (02 Sep 2018 08:00)  T(F): 99.3 (02 Sep 2018 08:00), Max: 99.3 (02 Sep 2018 08:00)  HR: 117 (02 Sep 2018 13:53) (98 - 138)  BP: 90/47 (02 Sep 2018 13:00) (90/45 - 90/47)  BP(mean): 57 (02 Sep 2018 13:00) (56 - 57)  ABP: 100/48 (02 Sep 2018 13:00) (84/35 - 156/71)  ABP(mean): 64 (02 Sep 2018 13:00) (51 - 95)  RR: 20 (02 Sep 2018 13:00) (11 - 22)  SpO2: 100% (02 Sep 2018 13:53) (94% - 100%)    Mode: AC/ CMV (Assist Control/ Continuous Mandatory Ventilation), RR (machine): 16, TV (machine): 350, FiO2: 40, PEEP: 5, MAP: 10.9, PIP: 18    09-01 @ 07:01 - 09-02 @ 07:00  --------------------------------------------------------  IN: 3100.3 mL / OUT: 610 mL / NET: 2490.3 mL    09-02 @ 07:01 - 09-02 @ 14:07  --------------------------------------------------------  IN: 514.2 mL / OUT: 120 mL / NET: 394.2 mL      CAPILLARY BLOOD GLUCOSE      POCT Blood Glucose.: 352 mg/dL (02 Sep 2018 12:38)      PHYSICAL EXAM:  	GENERAL: intubated, sedated.   	HEENT: pupils equal & reactive, no scleral icterus  	HEART: S1, S2. No murmurs noted.     LUNGS: Diffuse rhonchi heard in bilateral lung fields with occasional wheezes   	ABDOMEN: soft, nondistended.   	NEURO: sedated.   EXT: No LE edema    MEDICATIONS:  MEDICATIONS  (STANDING):  ALBUTerol    90 MICROgram(s) HFA Inhaler 2 Puff(s) Inhalation every 4 hours  artificial  tears Solution 1 Drop(s) Both EYES daily  aspirin  chewable 81 milliGRAM(s) Oral daily  atorvastatin 20 milliGRAM(s) Oral at bedtime  azithromycin  IVPB 500 milliGRAM(s) IV Intermittent every 24 hours  chlorhexidine 0.12% Liquid 15 milliLiter(s) Swish and Spit two times a day  chlorhexidine 4% Liquid 1 Application(s) Topical <User Schedule>  cisatracurium Infusion 3 MICROgram(s)/kG/Min (13.392 mL/Hr) IV Continuous <Continuous>  dextrose 5%. 1000 milliLiter(s) (50 mL/Hr) IV Continuous <Continuous>  dextrose 50% Injectable 12.5 Gram(s) IV Push once  dextrose 50% Injectable 25 Gram(s) IV Push once  dextrose 50% Injectable 25 Gram(s) IV Push once  enoxaparin Injectable 40 milliGRAM(s) SubCutaneous every 24 hours  fentaNYL   Infusion. 0.5 MICROgram(s)/kG/Hr (3.72 mL/Hr) IV Continuous <Continuous>  insulin lispro (HumaLOG) corrective regimen sliding scale   SubCutaneous every 6 hours  ipratropium 17 MICROgram(s) HFA Inhaler 1 Puff(s) Inhalation every 6 hours  methylPREDNISolone sodium succinate Injectable 40 milliGRAM(s) IV Push every 12 hours  phenylephrine    Infusion 0.1 MICROgram(s)/kG/Min (1.395 mL/Hr) IV Continuous <Continuous>  propofol Infusion 50 MICROgram(s)/kG/Min (21 mL/Hr) IV Continuous <Continuous>  sodium chloride 0.9%. 500 milliLiter(s) (500 mL/Hr) IV Continuous <Continuous>    MEDICATIONS  (PRN):  dextrose 40% Gel 15 Gram(s) Oral once PRN Blood Glucose LESS THAN 70 milliGRAM(s)/deciliter  glucagon  Injectable 1 milliGRAM(s) IntraMuscular once PRN Glucose LESS THAN 70 milligrams/deciliter      ALLERGIES:  Allergies    Carrots (Pruritus)  dust (Pruritus)  No Known Drug Allergies  Nuts (Pruritus)  shellfish (Pruritus)    Intolerances        LABS:                        9.0    9.99  )-----------( 95       ( 02 Sep 2018 02:32 )             28.3     CBC Full  -  ( 02 Sep 2018 02:32 )  WBC Count : 9.99 K/uL  Hemoglobin : 9.0 g/dL  Hematocrit : 28.3 %  Platelet Count - Automated : 95 K/uL  Mean Cell Volume : 106.8 fL  Mean Cell Hemoglobin : 34.0 pg  Mean Cell Hemoglobin Concentration : 31.8 %  Auto Neutrophil # : 7.68 K/uL  Auto Lymphocyte # : 0.77 K/uL  Auto Monocyte # : 0.80 K/uL  Auto Eosinophil # : 0.00 K/uL  Auto Basophil # : 0.01 K/uL  Auto Neutrophil % : 76.9 %  Auto Lymphocyte % : 7.7 %  Auto Monocyte % : 8.0 %  Auto Eosinophil % : 0.0 %  Auto Basophil % : 0.1 %    09-02    139  |  106  |  26<H>  ----------------------------<  383<H>  4.3   |  21<L>  |  0.83    Ca    8.0<L>      02 Sep 2018 02:32  Phos  3.0     09-02  Mg     2.1     09-02    TPro  5.3<L>  /  Alb  2.6<L>  /  TBili  0.6  /  DBili  x   /  AST  74<H>  /  ALT  38<H>  /  AlkPhos  107  09-01    Creatinine Trend: 0.83<--, 0.74<--, 0.81<--, 0.63<--  LIVER FUNCTIONS - ( 01 Sep 2018 03:10 )  Alb: 2.6 g/dL / Pro: 5.3 g/dL / ALK PHOS: 107 u/L / ALT: 38 u/L / AST: 74 u/L / GGT: x             CARDIAC MARKERS ( 31 Aug 2018 15:05 )  x     / x     / 295 u/L / 23.93 ng/mL / x          ABG - ( 02 Sep 2018 10:10 )  pH, Arterial: 7.20  pH, Blood: x     /  pCO2: 60    /  pO2: 95    / HCO3: 20    / Base Excess: -4.2  /  SaO2: 96.6                  MICROBIOLOGY:    Culture - Blood (collected 30 Aug 2018 23:45)  Source: BLOOD VENOUS  Preliminary Report (01 Sep 2018 23:45):    NO ORGANISMS ISOLATED    NO ORGANISMS ISOLATED AT 48 HRS.    Culture - Blood (collected 30 Aug 2018 23:45)  Source: BLOOD PERIPHERAL  Preliminary Report (01 Sep 2018 23:45):    NO ORGANISMS ISOLATED    NO ORGANISMS ISOLATED AT 48 HRS.      IMAGING:    RADIOLOGY & ADDITIONAL TESTS: Reviewed.    · Assessment		  78F with COPD, asthma, GERD, HLD, HTN, spinal stenosis w/ hardware, colon CA (dx. 02/2018 s/p colon resection in 03/2018) presents with SOB and severe COPD exacerbation requiring intubation in the setting of positive hMPV.     # Neuro:   - C/W propofol for sedation with daily sedation vacation  - C/W Nimbex gtt, will repeat ABG and if patient's PCO2 and PO2 is improved will consider discontinuing nimbex  - C/W fentanyl gtt      # CV: Severe LV systolic dysfunction on POCUS   - history of CAD unclear, will clarify with PMD and daughter  - possibly Takotsubo/stress-induced cardiomyopathy  - Troponins trended down, no need to trend further.   - will start ASA / Lipitor   Hypotension likely due to propofol vs. stress-induced cardiomyopathy  - on Levophed, titrate to MAP >65  -Will call cardiology once patient is stabilized    # Pulm: Hypercapneic respiratory failure with COPD exacerbation, emergently intubated and paralyzed to optimize ventilation   - likely secondary to hMPV respiratory infection  - continues to have diffuse wheezes/rhonchi on exam. Continue Albuterol/Atrovent inhaler Q4.   - Decreased lung sliding noted on bedside echo. CXR negative for pneumnothorax.   - DECREASE Solu-Medrol 40 MG bid IV   -Start levoquin 3 day course for COPD excasterbation   -Metaneb     # ID: Sepsis secondary to hMPV respiratory infection  - Doubt PNA  -monitor off antibiotics given CXR with clear lungs   - s/p 3L IVF resuscitation in ED     # Onc: Colon cancer s/p resection in 03/2018 on chemotherapy  - on oxaliplatin therapy (last given 8/20) and Xeloda (last given 8/25)  - outpatient Oncologist Dr. Juancarlos Yepez ([phone])    # Renal: serum Cr at baseline     # DVT ppx: Lovenox 40 subQ

## 2018-09-03 VITALS — HEART RATE: 139 BPM | RESPIRATION RATE: 18 BRPM

## 2018-09-03 DIAGNOSIS — N17.9 ACUTE KIDNEY FAILURE, UNSPECIFIED: ICD-10-CM

## 2018-09-03 LAB
ALBUMIN SERPL ELPH-MCNC: 1.2 G/DL — LOW (ref 3.3–5)
ALBUMIN SERPL ELPH-MCNC: 2 G/DL — LOW (ref 3.3–5)
ALP SERPL-CCNC: 122 U/L — HIGH (ref 40–120)
ALP SERPL-CCNC: 144 U/L — HIGH (ref 40–120)
ALT FLD-CCNC: 40 U/L — HIGH (ref 4–33)
ALT FLD-CCNC: 59 U/L — HIGH (ref 4–33)
AMORPH CRY # UR COMP ASSIST: SIGNIFICANT CHANGE UP (ref 0–0)
ANISOCYTOSIS BLD QL: SLIGHT — SIGNIFICANT CHANGE UP
APPEARANCE UR: SIGNIFICANT CHANGE UP
APTT BLD: 41.4 SEC — HIGH (ref 27.5–37.4)
AST SERPL-CCNC: 110 U/L — HIGH (ref 4–32)
AST SERPL-CCNC: 45 U/L — HIGH (ref 4–32)
BACTERIA # UR AUTO: HIGH
BASE EXCESS BLDA CALC-SCNC: -0.4 MMOL/L — SIGNIFICANT CHANGE UP
BASE EXCESS BLDA CALC-SCNC: -1.1 MMOL/L — SIGNIFICANT CHANGE UP
BASE EXCESS BLDA CALC-SCNC: -6.3 MMOL/L — SIGNIFICANT CHANGE UP
BASE EXCESS BLDA CALC-SCNC: -8.8 MMOL/L — SIGNIFICANT CHANGE UP
BASE EXCESS BLDA CALC-SCNC: -9.8 MMOL/L — SIGNIFICANT CHANGE UP
BASOPHILS # BLD AUTO: 0.02 K/UL — SIGNIFICANT CHANGE UP (ref 0–0.2)
BASOPHILS NFR BLD AUTO: 0.3 % — SIGNIFICANT CHANGE UP (ref 0–2)
BILIRUB SERPL-MCNC: 0.7 MG/DL — SIGNIFICANT CHANGE UP (ref 0.2–1.2)
BILIRUB SERPL-MCNC: 0.7 MG/DL — SIGNIFICANT CHANGE UP (ref 0.2–1.2)
BILIRUB UR-MCNC: SIGNIFICANT CHANGE UP
BLOOD UR QL VISUAL: NEGATIVE — SIGNIFICANT CHANGE UP
BUN SERPL-MCNC: 44 MG/DL — HIGH (ref 7–23)
BUN SERPL-MCNC: 49 MG/DL — HIGH (ref 7–23)
BUN SERPL-MCNC: 52 MG/DL — HIGH (ref 7–23)
CA-I BLDA-SCNC: 1.08 MMOL/L — LOW (ref 1.15–1.29)
CALCIUM SERPL-MCNC: 6.3 MG/DL — CRITICAL LOW (ref 8.4–10.5)
CALCIUM SERPL-MCNC: 6.9 MG/DL — LOW (ref 8.4–10.5)
CALCIUM SERPL-MCNC: 7.6 MG/DL — LOW (ref 8.4–10.5)
CHLORIDE BLDA-SCNC: 109 MMOL/L — HIGH (ref 96–108)
CHLORIDE BLDA-SCNC: 111 MMOL/L — HIGH (ref 96–108)
CHLORIDE SERPL-SCNC: 102 MMOL/L — SIGNIFICANT CHANGE UP (ref 98–107)
CHLORIDE SERPL-SCNC: 105 MMOL/L — SIGNIFICANT CHANGE UP (ref 98–107)
CHLORIDE SERPL-SCNC: 105 MMOL/L — SIGNIFICANT CHANGE UP (ref 98–107)
CHLORIDE UR-SCNC: < 20 MMOL/L — SIGNIFICANT CHANGE UP
CO2 SERPL-SCNC: 15 MMOL/L — LOW (ref 22–31)
CO2 SERPL-SCNC: 21 MMOL/L — LOW (ref 22–31)
CO2 SERPL-SCNC: 22 MMOL/L — SIGNIFICANT CHANGE UP (ref 22–31)
COLOR SPEC: YELLOW — SIGNIFICANT CHANGE UP
CREAT ?TM UR-MCNC: 160.2 MG/DL — SIGNIFICANT CHANGE UP
CREAT BLDA-MCNC: 1.78 MG/DL — HIGH (ref 0.5–1.3)
CREAT SERPL-MCNC: 1.62 MG/DL — HIGH (ref 0.5–1.3)
CREAT SERPL-MCNC: 2.04 MG/DL — HIGH (ref 0.5–1.3)
CREAT SERPL-MCNC: 2.49 MG/DL — HIGH (ref 0.5–1.3)
EOSINOPHIL # BLD AUTO: 0 K/UL — SIGNIFICANT CHANGE UP (ref 0–0.5)
EOSINOPHIL NFR BLD AUTO: 0 % — SIGNIFICANT CHANGE UP (ref 0–6)
EPI CELLS # UR: SIGNIFICANT CHANGE UP
GLUCOSE BLDA-MCNC: 146 MG/DL — HIGH (ref 70–99)
GLUCOSE BLDA-MCNC: 163 MG/DL — HIGH (ref 70–99)
GLUCOSE BLDA-MCNC: 170 MG/DL — HIGH (ref 70–99)
GLUCOSE BLDA-MCNC: 203 MG/DL — HIGH (ref 70–99)
GLUCOSE BLDA-MCNC: 284 MG/DL — HIGH (ref 70–99)
GLUCOSE BLDC GLUCOMTR-MCNC: 126 MG/DL — HIGH (ref 70–99)
GLUCOSE BLDC GLUCOMTR-MCNC: 127 MG/DL — HIGH (ref 70–99)
GLUCOSE BLDC GLUCOMTR-MCNC: 206 MG/DL — HIGH (ref 70–99)
GLUCOSE SERPL-MCNC: 150 MG/DL — HIGH (ref 70–99)
GLUCOSE SERPL-MCNC: 152 MG/DL — HIGH (ref 70–99)
GLUCOSE SERPL-MCNC: 243 MG/DL — HIGH (ref 70–99)
GLUCOSE UR-MCNC: NEGATIVE — SIGNIFICANT CHANGE UP
GRAM STN SPT: SIGNIFICANT CHANGE UP
HCO3 BLDA-SCNC: 15 MMOL/L — LOW (ref 22–26)
HCO3 BLDA-SCNC: 17 MMOL/L — LOW (ref 22–26)
HCO3 BLDA-SCNC: 19 MMOL/L — LOW (ref 22–26)
HCO3 BLDA-SCNC: 23 MMOL/L — SIGNIFICANT CHANGE UP (ref 22–26)
HCO3 BLDA-SCNC: 23 MMOL/L — SIGNIFICANT CHANGE UP (ref 22–26)
HCT VFR BLD CALC: 28.6 % — LOW (ref 34.5–45)
HCT VFR BLD CALC: 28.7 % — LOW (ref 34.5–45)
HCT VFR BLDA CALC: 27.1 % — LOW (ref 34.5–46.5)
HCT VFR BLDA CALC: 28.5 % — LOW (ref 34.5–46.5)
HCT VFR BLDA CALC: 28.8 % — LOW (ref 34.5–46.5)
HCT VFR BLDA CALC: 29.1 % — LOW (ref 34.5–46.5)
HCT VFR BLDA CALC: 30.4 % — LOW (ref 34.5–46.5)
HGB BLD-MCNC: 8.7 G/DL — LOW (ref 11.5–15.5)
HGB BLD-MCNC: 8.8 G/DL — LOW (ref 11.5–15.5)
HGB BLDA-MCNC: 8.7 G/DL — LOW (ref 11.5–15.5)
HGB BLDA-MCNC: 9.2 G/DL — LOW (ref 11.5–15.5)
HGB BLDA-MCNC: 9.3 G/DL — LOW (ref 11.5–15.5)
HGB BLDA-MCNC: 9.4 G/DL — LOW (ref 11.5–15.5)
HGB BLDA-MCNC: 9.8 G/DL — LOW (ref 11.5–15.5)
HYPOCHROMIA BLD QL: SLIGHT — SIGNIFICANT CHANGE UP
IMM GRANULOCYTES # BLD AUTO: 0.02 # — SIGNIFICANT CHANGE UP
IMM GRANULOCYTES NFR BLD AUTO: 0.3 % — SIGNIFICANT CHANGE UP (ref 0–1.5)
INR BLD: 1.22 — HIGH (ref 0.88–1.17)
KETONES UR-MCNC: 160 — SIGNIFICANT CHANGE UP
LACTATE BLDA-SCNC: 4.2 MMOL/L — CRITICAL HIGH (ref 0.5–2)
LACTATE BLDA-SCNC: 4.8 MMOL/L — CRITICAL HIGH (ref 0.5–2)
LACTATE BLDA-SCNC: 5.4 MMOL/L — CRITICAL HIGH (ref 0.5–2)
LEUKOCYTE ESTERASE UR-ACNC: NEGATIVE — SIGNIFICANT CHANGE UP
LG PLATELETS BLD QL AUTO: SLIGHT — SIGNIFICANT CHANGE UP
LYMPHOCYTES # BLD AUTO: 0.98 K/UL — LOW (ref 1–3.3)
LYMPHOCYTES # BLD AUTO: 16.8 % — SIGNIFICANT CHANGE UP (ref 13–44)
MACROCYTES BLD QL: SLIGHT — SIGNIFICANT CHANGE UP
MAGNESIUM SERPL-MCNC: 1.9 MG/DL — SIGNIFICANT CHANGE UP (ref 1.6–2.6)
MAGNESIUM SERPL-MCNC: 2 MG/DL — SIGNIFICANT CHANGE UP (ref 1.6–2.6)
MAGNESIUM SERPL-MCNC: 2.1 MG/DL — SIGNIFICANT CHANGE UP (ref 1.6–2.6)
MANUAL SMEAR VERIFICATION: SIGNIFICANT CHANGE UP
MCHC RBC-ENTMCNC: 30.4 % — LOW (ref 32–36)
MCHC RBC-ENTMCNC: 30.7 % — LOW (ref 32–36)
MCHC RBC-ENTMCNC: 33.1 PG — SIGNIFICANT CHANGE UP (ref 27–34)
MCHC RBC-ENTMCNC: 33.3 PG — SIGNIFICANT CHANGE UP (ref 27–34)
MCV RBC AUTO: 107.9 FL — HIGH (ref 80–100)
MCV RBC AUTO: 109.6 FL — HIGH (ref 80–100)
MONOCYTES # BLD AUTO: 0.19 K/UL — SIGNIFICANT CHANGE UP (ref 0–0.9)
MONOCYTES NFR BLD AUTO: 3.2 % — SIGNIFICANT CHANGE UP (ref 2–14)
NEUTROPHILS # BLD AUTO: 4.64 K/UL — SIGNIFICANT CHANGE UP (ref 1.8–7.4)
NEUTROPHILS NFR BLD AUTO: 79.4 % — HIGH (ref 43–77)
NITRITE UR-MCNC: NEGATIVE — SIGNIFICANT CHANGE UP
NRBC # FLD: 0.82 — SIGNIFICANT CHANGE UP
NRBC # FLD: 1.57 — SIGNIFICANT CHANGE UP
NRBC FLD-RTO: 106.1 — SIGNIFICANT CHANGE UP
NRBC FLD-RTO: 14 — SIGNIFICANT CHANGE UP
OSMOLALITY UR: 321 MOSMO/KG — SIGNIFICANT CHANGE UP (ref 50–1200)
OVALOCYTES BLD QL SMEAR: SLIGHT — SIGNIFICANT CHANGE UP
PCO2 BLDA: 50 MMHG — HIGH (ref 32–48)
PCO2 BLDA: 50 MMHG — HIGH (ref 32–48)
PCO2 BLDA: 55 MMHG — HIGH (ref 32–48)
PCO2 BLDA: 59 MMHG — HIGH (ref 32–48)
PCO2 BLDA: 67 MMHG — HIGH (ref 32–48)
PH BLDA: 7.08 PH — CRITICAL LOW (ref 7.35–7.45)
PH BLDA: 7.15 PH — CRITICAL LOW (ref 7.35–7.45)
PH BLDA: 7.23 PH — LOW (ref 7.35–7.45)
PH BLDA: 7.26 PH — LOW (ref 7.35–7.45)
PH BLDA: 7.31 PH — LOW (ref 7.35–7.45)
PH UR: 5 — SIGNIFICANT CHANGE UP (ref 5–8)
PHOSPHATE SERPL-MCNC: 1.9 MG/DL — LOW (ref 2.5–4.5)
PHOSPHATE SERPL-MCNC: 4.4 MG/DL — SIGNIFICANT CHANGE UP (ref 2.5–4.5)
PHOSPHATE SERPL-MCNC: 6.6 MG/DL — HIGH (ref 2.5–4.5)
PLATELET # BLD AUTO: 27 K/UL — LOW (ref 150–400)
PLATELET # BLD AUTO: 81 K/UL — LOW (ref 150–400)
PLATELET COUNT - ESTIMATE: SIGNIFICANT CHANGE UP
PMV BLD: 12.6 FL — SIGNIFICANT CHANGE UP (ref 7–13)
PMV BLD: SIGNIFICANT CHANGE UP FL (ref 7–13)
PO2 BLDA: 115 MMHG — HIGH (ref 83–108)
PO2 BLDA: 129 MMHG — HIGH (ref 83–108)
PO2 BLDA: 132 MMHG — HIGH (ref 83–108)
PO2 BLDA: 83 MMHG — SIGNIFICANT CHANGE UP (ref 83–108)
PO2 BLDA: 85 MMHG — SIGNIFICANT CHANGE UP (ref 83–108)
POLYCHROMASIA BLD QL SMEAR: SLIGHT — SIGNIFICANT CHANGE UP
POTASSIUM BLDA-SCNC: 3.1 MMOL/L — LOW (ref 3.4–4.5)
POTASSIUM BLDA-SCNC: 3.5 MMOL/L — SIGNIFICANT CHANGE UP (ref 3.4–4.5)
POTASSIUM BLDA-SCNC: 3.5 MMOL/L — SIGNIFICANT CHANGE UP (ref 3.4–4.5)
POTASSIUM BLDA-SCNC: 4.8 MMOL/L — HIGH (ref 3.4–4.5)
POTASSIUM BLDA-SCNC: 4.8 MMOL/L — HIGH (ref 3.4–4.5)
POTASSIUM SERPL-MCNC: 3.5 MMOL/L — SIGNIFICANT CHANGE UP (ref 3.5–5.3)
POTASSIUM SERPL-MCNC: 4 MMOL/L — SIGNIFICANT CHANGE UP (ref 3.5–5.3)
POTASSIUM SERPL-MCNC: 5.6 MMOL/L — HIGH (ref 3.5–5.3)
POTASSIUM SERPL-SCNC: 3.5 MMOL/L — SIGNIFICANT CHANGE UP (ref 3.5–5.3)
POTASSIUM SERPL-SCNC: 4 MMOL/L — SIGNIFICANT CHANGE UP (ref 3.5–5.3)
POTASSIUM SERPL-SCNC: 5.6 MMOL/L — HIGH (ref 3.5–5.3)
POTASSIUM UR-SCNC: 38.1 MMOL/L — SIGNIFICANT CHANGE UP
PROT SERPL-MCNC: 3.9 G/DL — LOW (ref 6–8.3)
PROT SERPL-MCNC: 4.8 G/DL — LOW (ref 6–8.3)
PROT UR-MCNC: 100 — SIGNIFICANT CHANGE UP
PROTHROM AB SERPL-ACNC: 13.6 SEC — HIGH (ref 9.8–13.1)
RBC # BLD: 2.61 M/UL — LOW (ref 3.8–5.2)
RBC # BLD: 2.66 M/UL — LOW (ref 3.8–5.2)
RBC # FLD: 22.1 % — HIGH (ref 10.3–14.5)
RBC # FLD: 22.8 % — HIGH (ref 10.3–14.5)
RBC CASTS # UR COMP ASSIST: SIGNIFICANT CHANGE UP (ref 0–?)
SAO2 % BLDA: 92.9 % — LOW (ref 95–99)
SAO2 % BLDA: 95.4 % — SIGNIFICANT CHANGE UP (ref 95–99)
SAO2 % BLDA: 95.5 % — SIGNIFICANT CHANGE UP (ref 95–99)
SAO2 % BLDA: 96.8 % — SIGNIFICANT CHANGE UP (ref 95–99)
SAO2 % BLDA: 98.8 % — SIGNIFICANT CHANGE UP (ref 95–99)
SODIUM BLDA-SCNC: 135 MMOL/L — LOW (ref 136–146)
SODIUM BLDA-SCNC: 137 MMOL/L — SIGNIFICANT CHANGE UP (ref 136–146)
SODIUM BLDA-SCNC: 138 MMOL/L — SIGNIFICANT CHANGE UP (ref 136–146)
SODIUM BLDA-SCNC: 140 MMOL/L — SIGNIFICANT CHANGE UP (ref 136–146)
SODIUM BLDA-SCNC: 140 MMOL/L — SIGNIFICANT CHANGE UP (ref 136–146)
SODIUM SERPL-SCNC: 141 MMOL/L — SIGNIFICANT CHANGE UP (ref 135–145)
SODIUM SERPL-SCNC: 143 MMOL/L — SIGNIFICANT CHANGE UP (ref 135–145)
SODIUM SERPL-SCNC: 145 MMOL/L — SIGNIFICANT CHANGE UP (ref 135–145)
SODIUM UR-SCNC: 24 MMOL/L — SIGNIFICANT CHANGE UP
SP GR SPEC: 1.03 — SIGNIFICANT CHANGE UP (ref 1–1.04)
SPECIMEN SOURCE: SIGNIFICANT CHANGE UP
UROBILINOGEN FLD QL: NORMAL — SIGNIFICANT CHANGE UP
UUN UR-MCNC: 140.6 MG/DL — SIGNIFICANT CHANGE UP
WBC # BLD: 1.48 K/UL — LOW (ref 3.8–10.5)
WBC # BLD: 5.85 K/UL — SIGNIFICANT CHANGE UP (ref 3.8–10.5)
WBC # FLD AUTO: 1.48 K/UL — LOW (ref 3.8–10.5)
WBC # FLD AUTO: 5.85 K/UL — SIGNIFICANT CHANGE UP (ref 3.8–10.5)
WBC UR QL: SIGNIFICANT CHANGE UP (ref 0–?)

## 2018-09-03 PROCEDURE — 99222 1ST HOSP IP/OBS MODERATE 55: CPT | Mod: GC

## 2018-09-03 PROCEDURE — 99291 CRITICAL CARE FIRST HOUR: CPT

## 2018-09-03 RX ORDER — AMIODARONE HYDROCHLORIDE 400 MG/1
150 TABLET ORAL ONCE
Qty: 0 | Refills: 0 | Status: COMPLETED | OUTPATIENT
Start: 2018-09-03 | End: 2018-09-03

## 2018-09-03 RX ORDER — ALBUMIN HUMAN 25 %
100 VIAL (ML) INTRAVENOUS ONCE
Qty: 0 | Refills: 0 | Status: DISCONTINUED | OUTPATIENT
Start: 2018-09-03 | End: 2018-09-03

## 2018-09-03 RX ORDER — SODIUM BICARBONATE 1 MEQ/ML
50 SYRINGE (ML) INTRAVENOUS ONCE
Qty: 0 | Refills: 0 | Status: DISCONTINUED | OUTPATIENT
Start: 2018-09-03 | End: 2018-09-03

## 2018-09-03 RX ORDER — SODIUM BICARBONATE 1 MEQ/ML
50 SYRINGE (ML) INTRAVENOUS ONCE
Qty: 0 | Refills: 0 | Status: COMPLETED | OUTPATIENT
Start: 2018-09-03 | End: 2018-09-03

## 2018-09-03 RX ORDER — HEPARIN SODIUM 5000 [USP'U]/ML
5000 INJECTION INTRAVENOUS; SUBCUTANEOUS EVERY 8 HOURS
Qty: 0 | Refills: 0 | Status: DISCONTINUED | OUTPATIENT
Start: 2018-09-03 | End: 2018-09-03

## 2018-09-03 RX ORDER — SODIUM CHLORIDE 9 MG/ML
1000 INJECTION, SOLUTION INTRAVENOUS
Qty: 0 | Refills: 0 | Status: DISCONTINUED | OUTPATIENT
Start: 2018-09-03 | End: 2018-09-03

## 2018-09-03 RX ORDER — SODIUM CHLORIDE 9 MG/ML
1000 INJECTION, SOLUTION INTRAVENOUS ONCE
Qty: 0 | Refills: 0 | Status: COMPLETED | OUTPATIENT
Start: 2018-09-03 | End: 2018-09-03

## 2018-09-03 RX ORDER — VASOPRESSIN 20 [USP'U]/ML
0.04 INJECTION INTRAVENOUS
Qty: 100 | Refills: 0 | Status: DISCONTINUED | OUTPATIENT
Start: 2018-09-03 | End: 2018-09-03

## 2018-09-03 RX ORDER — SODIUM CHLORIDE 9 MG/ML
1000 INJECTION, SOLUTION INTRAVENOUS ONCE
Qty: 0 | Refills: 0 | Status: DISCONTINUED | OUTPATIENT
Start: 2018-09-03 | End: 2018-09-03

## 2018-09-03 RX ORDER — ALBUTEROL 90 UG/1
2 AEROSOL, METERED ORAL EVERY 4 HOURS
Qty: 0 | Refills: 0 | Status: DISCONTINUED | OUTPATIENT
Start: 2018-09-03 | End: 2018-09-03

## 2018-09-03 RX ORDER — AMIODARONE HYDROCHLORIDE 400 MG/1
1 TABLET ORAL
Qty: 450 | Refills: 0 | Status: DISCONTINUED | OUTPATIENT
Start: 2018-09-03 | End: 2018-09-03

## 2018-09-03 RX ORDER — IPRATROPIUM BROMIDE 0.2 MG/ML
1 SOLUTION, NON-ORAL INHALATION EVERY 6 HOURS
Qty: 0 | Refills: 0 | Status: DISCONTINUED | OUTPATIENT
Start: 2018-09-03 | End: 2018-09-03

## 2018-09-03 RX ORDER — ALBUMIN HUMAN 25 %
50 VIAL (ML) INTRAVENOUS ONCE
Qty: 0 | Refills: 0 | Status: COMPLETED | OUTPATIENT
Start: 2018-09-03 | End: 2018-09-03

## 2018-09-03 RX ORDER — ACETAMINOPHEN 500 MG
1000 TABLET ORAL ONCE
Qty: 0 | Refills: 0 | Status: COMPLETED | OUTPATIENT
Start: 2018-09-03 | End: 2018-09-03

## 2018-09-03 RX ORDER — SODIUM BICARBONATE 1 MEQ/ML
0.2 SYRINGE (ML) INTRAVENOUS
Qty: 150 | Refills: 0 | Status: DISCONTINUED | OUTPATIENT
Start: 2018-09-03 | End: 2018-09-03

## 2018-09-03 RX ORDER — INSULIN GLARGINE 100 [IU]/ML
10 INJECTION, SOLUTION SUBCUTANEOUS EVERY MORNING
Qty: 0 | Refills: 0 | Status: DISCONTINUED | OUTPATIENT
Start: 2018-09-03 | End: 2018-09-03

## 2018-09-03 RX ORDER — NOREPINEPHRINE BITARTRATE/D5W 8 MG/250ML
0.05 PLASTIC BAG, INJECTION (ML) INTRAVENOUS
Qty: 16 | Refills: 0 | Status: DISCONTINUED | OUTPATIENT
Start: 2018-09-03 | End: 2018-09-03

## 2018-09-03 RX ORDER — VANCOMYCIN HCL 1 G
1000 VIAL (EA) INTRAVENOUS ONCE
Qty: 0 | Refills: 0 | Status: DISCONTINUED | OUTPATIENT
Start: 2018-09-03 | End: 2018-09-03

## 2018-09-03 RX ORDER — POTASSIUM PHOSPHATE, MONOBASIC POTASSIUM PHOSPHATE, DIBASIC 236; 224 MG/ML; MG/ML
15 INJECTION, SOLUTION INTRAVENOUS ONCE
Qty: 0 | Refills: 0 | Status: COMPLETED | OUTPATIENT
Start: 2018-09-03 | End: 2018-09-03

## 2018-09-03 RX ORDER — MEROPENEM 1 G/30ML
500 INJECTION INTRAVENOUS EVERY 12 HOURS
Qty: 0 | Refills: 0 | Status: DISCONTINUED | OUTPATIENT
Start: 2018-09-04 | End: 2018-09-03

## 2018-09-03 RX ORDER — MEROPENEM 1 G/30ML
500 INJECTION INTRAVENOUS ONCE
Qty: 0 | Refills: 0 | Status: DISCONTINUED | OUTPATIENT
Start: 2018-09-03 | End: 2018-09-03

## 2018-09-03 RX ORDER — AMIODARONE HYDROCHLORIDE 400 MG/1
0.5 TABLET ORAL
Qty: 450 | Refills: 0 | Status: DISCONTINUED | OUTPATIENT
Start: 2018-09-03 | End: 2018-09-03

## 2018-09-03 RX ORDER — MEROPENEM 1 G/30ML
INJECTION INTRAVENOUS
Qty: 0 | Refills: 0 | Status: DISCONTINUED | OUTPATIENT
Start: 2018-09-03 | End: 2018-09-03

## 2018-09-03 RX ORDER — NOREPINEPHRINE BITARTRATE/D5W 8 MG/250ML
0.05 PLASTIC BAG, INJECTION (ML) INTRAVENOUS
Qty: 8 | Refills: 0 | Status: DISCONTINUED | OUTPATIENT
Start: 2018-09-03 | End: 2018-09-03

## 2018-09-03 RX ORDER — SODIUM CHLORIDE 9 MG/ML
3 INJECTION INTRAMUSCULAR; INTRAVENOUS; SUBCUTANEOUS EVERY 6 HOURS
Qty: 0 | Refills: 0 | Status: DISCONTINUED | OUTPATIENT
Start: 2018-09-03 | End: 2018-09-03

## 2018-09-03 RX ADMIN — PROPOFOL 21 MICROGRAM(S)/KG/MIN: 10 INJECTION, EMULSION INTRAVENOUS at 08:11

## 2018-09-03 RX ADMIN — ALBUTEROL 2 PUFF(S): 90 AEROSOL, METERED ORAL at 04:35

## 2018-09-03 RX ADMIN — Medication 1 PUFF(S): at 04:35

## 2018-09-03 RX ADMIN — SODIUM CHLORIDE 75 MILLILITER(S): 9 INJECTION, SOLUTION INTRAVENOUS at 17:49

## 2018-09-03 RX ADMIN — Medication 1 PUFF(S): at 10:10

## 2018-09-03 RX ADMIN — Medication 100 MEQ/KG/HR: at 01:26

## 2018-09-03 RX ADMIN — SODIUM CHLORIDE 2000 MILLILITER(S): 9 INJECTION, SOLUTION INTRAVENOUS at 08:51

## 2018-09-03 RX ADMIN — Medication 1 PUFF(S): at 00:26

## 2018-09-03 RX ADMIN — AMIODARONE HYDROCHLORIDE 618 MILLIGRAM(S): 400 TABLET ORAL at 17:49

## 2018-09-03 RX ADMIN — POTASSIUM PHOSPHATE, MONOBASIC POTASSIUM PHOSPHATE, DIBASIC 62.5 MILLIMOLE(S): 236; 224 INJECTION, SOLUTION INTRAVENOUS at 04:20

## 2018-09-03 RX ADMIN — CHLORHEXIDINE GLUCONATE 15 MILLILITER(S): 213 SOLUTION TOPICAL at 05:17

## 2018-09-03 RX ADMIN — ALBUTEROL 2 PUFF(S): 90 AEROSOL, METERED ORAL at 10:10

## 2018-09-03 RX ADMIN — FENTANYL CITRATE 3.72 MICROGRAM(S)/KG/HR: 50 INJECTION INTRAVENOUS at 08:12

## 2018-09-03 RX ADMIN — Medication 400 MILLIGRAM(S): at 16:18

## 2018-09-03 RX ADMIN — HEPARIN SODIUM 5000 UNIT(S): 5000 INJECTION INTRAVENOUS; SUBCUTANEOUS at 13:11

## 2018-09-03 RX ADMIN — SODIUM CHLORIDE 100 MILLILITER(S): 9 INJECTION, SOLUTION INTRAVENOUS at 08:10

## 2018-09-03 RX ADMIN — Medication 1 DROP(S): at 11:39

## 2018-09-03 RX ADMIN — SODIUM CHLORIDE 3 MILLILITER(S): 9 INJECTION INTRAMUSCULAR; INTRAVENOUS; SUBCUTANEOUS at 10:10

## 2018-09-03 RX ADMIN — SODIUM CHLORIDE 3 MILLILITER(S): 9 INJECTION INTRAMUSCULAR; INTRAVENOUS; SUBCUTANEOUS at 04:45

## 2018-09-03 RX ADMIN — Medication 40 MILLIGRAM(S): at 05:18

## 2018-09-03 RX ADMIN — Medication 1 PUFF(S): at 16:31

## 2018-09-03 RX ADMIN — ALBUTEROL 2 PUFF(S): 90 AEROSOL, METERED ORAL at 16:30

## 2018-09-03 RX ADMIN — INSULIN GLARGINE 10 UNIT(S): 100 INJECTION, SOLUTION SUBCUTANEOUS at 10:40

## 2018-09-03 RX ADMIN — VASOPRESSIN 2.4 UNIT(S)/MIN: 20 INJECTION INTRAVENOUS at 18:20

## 2018-09-03 RX ADMIN — Medication 25 MILLILITER(S): at 16:43

## 2018-09-03 RX ADMIN — Medication 40 MILLIGRAM(S): at 18:20

## 2018-09-03 RX ADMIN — CHLORHEXIDINE GLUCONATE 15 MILLILITER(S): 213 SOLUTION TOPICAL at 18:20

## 2018-09-03 RX ADMIN — PHENYLEPHRINE HYDROCHLORIDE 1.4 MICROGRAM(S)/KG/MIN: 10 INJECTION INTRAVENOUS at 08:10

## 2018-09-03 RX ADMIN — Medication 81 MILLIGRAM(S): at 11:35

## 2018-09-03 RX ADMIN — ALBUTEROL 2 PUFF(S): 90 AEROSOL, METERED ORAL at 14:25

## 2018-09-03 RX ADMIN — Medication 3.49 MICROGRAM(S)/KG/MIN: at 10:03

## 2018-09-03 RX ADMIN — Medication 4: at 05:16

## 2018-09-03 RX ADMIN — AMIODARONE HYDROCHLORIDE 33.33 MG/MIN: 400 TABLET ORAL at 17:50

## 2018-09-03 RX ADMIN — Medication 50 MILLIEQUIVALENT(S): at 05:00

## 2018-09-03 RX ADMIN — CHLORHEXIDINE GLUCONATE 1 APPLICATION(S): 213 SOLUTION TOPICAL at 08:11

## 2018-09-03 RX ADMIN — POTASSIUM PHOSPHATE, MONOBASIC POTASSIUM PHOSPHATE, DIBASIC 62.5 MILLIMOLE(S): 236; 224 INJECTION, SOLUTION INTRAVENOUS at 06:00

## 2018-09-03 RX ADMIN — Medication 50 MILLIEQUIVALENT(S): at 03:23

## 2018-09-03 RX ADMIN — ALBUTEROL 2 PUFF(S): 90 AEROSOL, METERED ORAL at 00:26

## 2018-09-03 RX ADMIN — AZITHROMYCIN 250 MILLIGRAM(S): 500 TABLET, FILM COATED ORAL at 11:36

## 2018-09-03 RX ADMIN — CISATRACURIUM BESYLATE 13.39 MICROGRAM(S)/KG/MIN: 2 INJECTION INTRAVENOUS at 08:11

## 2018-09-03 NOTE — CHART NOTE - NSCHARTNOTEFT_GEN_A_CORE
: Zoran Gutierres    INDICATION: respiratory failure, shock state    PROCEDURE:  [x ] LIMITED ECHO  [x ] LIMITED CHEST  [ ] LIMITED RETROPERITONEAL  [ ] LIMITED ABDOMINAL  [ ] LIMITED DVT  [ ] NEEDLE GUIDANCE VASCULAR  [ ] NEEDLE GUIDANCE THORACENTESIS  [ ] NEEDLE GUIDANCE PARACENTESIS  [ ] NEEDLE GUIDANCE PERICARDIOCENTESIS  [ ] OTHER    FINDINGS:  Chest- scattered B lines anteriorly, no pleural effusions or consolidations   Echo- decreased LV systolic function with apical ballooning, RV smaller than LV     INTERPRETATION:  respiratory failure due to severe obstructive lung disease and hMPV infection and stress cardiomyopathy, continue w/ ventilator and treatment of COPD    Farheen Gutierres MD  Pulmonary Critical Care Fellow  [phone]
Pt found without a pulse at 7:15pm. Compressions started. Given 3 doses of epi, 2 doses of bicarb, 1 dose of calcium chloride, 1 shock for vfib, and 1 dose of magnesium. With ROSC at 7:24pm.    At 7:32pm pt found to be in asystole. Compression started. Given 5 doses of epi and 2 doses of bicarb. Pt without a pulse, absent breath sounds, and fixed pupils. Time of death called at 7:47pm.    Ant Carty MD  Internal Medicine PGY1  Pager: [phone]
Called by RN for respiratory distress and tachycardia.  In summary, 78F PMH COPD (no home o2), colon cancer (on chemo) presented with shortness of breath 2/2 COPD exacerbation in the setting of human metapneumovirus.  Patient initially placed on Bipap for increased work of breathing, improved with solumedrol and duonebs.  Patient w/ episodes of SVT to 160s, resolved w/ lopressor 5 mg x 2.  Called by RN that patient w/ worsening respiratory status, patient w/ diffuse wheezing, given initial duoneb without improvement.  MICU called to bedside given worsening status, patient had been evaluated by MICU earlier in the night and was not a candidate at that time as her respiratory status had improved at that time.  Bedside sono done by MICU team w/ decreased systolic function c/w likely taketsubo's cardiomyopathy, patient given lasix.  MICU team called daughter, full code per family.  Decision made to intubate patient for increased work of breathing w/ worsening mental status.  Patient intubated and accepted to MICU.     Rosi Samuels, PGY-3  [phone]

## 2018-09-03 NOTE — PROCEDURE NOTE - PROCEDURE DATE TIME, MLM
31-Aug-2018 15:17
02-Sep-2018 16:45
03-Sep-2018 19:09
03-Sep-2018 19:10
31-Aug-2018 07:51
31-Aug-2018 07:57
31-Aug-2018 07:53

## 2018-09-03 NOTE — CONSULT NOTE ADULT - ASSESSMENT
78 year old female with h/o COPD, HTN, HLD found to have oliguric HELENE, most likely hemodynamically mediated in setting of hypotension.

## 2018-09-03 NOTE — PROCEDURE NOTE - NSPROCDETAILS_GEN_ALL_CORE
nasogastric
connected to ventilator/patient pre-oxygenated, tube inserted, placement confirmed
location identified, draped/prepped, sterile technique used, needle inserted/introduced/connected to a pressurized flush line/positive blood return obtained via catheter/hemostasis with direct pressure, dressing applied/sutured in place
location identified, draped/prepped, sterile technique used, needle inserted/introduced/hemostasis with direct pressure, dressing applied/ultrasound guidance/positive blood return obtained via catheter/sutured in place/connected to a pressurized flush line/all materials/supplies accounted for at end of procedure
sterile technique, catheter placed/sterile dressing applied/ultrasound guidance/lumen(s) aspirated and flushed/guidewire recovered
sutured in place/hemostasis with direct pressure, dressing applied/location identified, draped/prepped, sterile technique used, needle inserted/introduced/Seldinger technique/all materials/supplies accounted for at end of procedure/positive blood return obtained via catheter/connected to a pressurized flush line
guidewire recovered/lumen(s) aspirated and flushed/sterile dressing applied/sterile technique, catheter placed/ultrasound guidance

## 2018-09-03 NOTE — PROCEDURE NOTE - NSINFORMCONSENT_GEN_A_CORE
This was an emergent procedure.

## 2018-09-03 NOTE — PROCEDURE NOTE - NSANESTHESIA_GEN_A_CORE
no anesthesia administered

## 2018-09-03 NOTE — PROGRESS NOTE ADULT - ATTENDING COMMENTS
Ms. Conde is a 78 year old female with COPD, asthma, who presents with SOB and COPD exacerbation requiring intubation in the setting of hMPV. Attempted to take off paralytics and keep on deep sedation with propofol and fentanyl but she continued to autopeep so placed back on nimbex. Aggressive PT (metaneb on Sunday), steroids, and duonebs Q4. Supportive care. Ultrasound shows improvement in LV function. Creatinine worsening and given a bolus of fluid but no change in urine output. Urine lytes sent. Consult Renal for possible dialysis in the near future. Metabolic acidosis is propagated by diarrhea. Will give back bicarb if acidosis persists. Prognosis guarded.

## 2018-09-03 NOTE — CONSULT NOTE ADULT - SUBJECTIVE AND OBJECTIVE BOX
[facility] Division of Kidney Diseases & Hypertension  INITIAL CONSULT NOTE  [phone]--------------------------------------------------------------------------------    HPI: 78 year old female with h/o COPD, HTN, HLP presented with complaints of SOB. As per daughter at bedside, patient was noted to have worsening SOB for 2 days. Therefore patient came to [facility] for further evaluation. Patient was placed on BIPAP initially but respiratory status worsened and patient was intubated. Patient is currently being treated for COPD exacerbation with IV steroids. 2 D echo was done as well which showed severe LV dysfunction as well.     Nephrology was consulted for elevated creatinine. As per family at bedside, patient does not have any history of kidney disease in the past. On review of previous labs, Scr. was noted to be 0.65 on 3/25/18. On recent admission, patient was noted to be 0.63 on 8/30 and 0.83 on 9/2. However patient noted to have low BP readings on 8/2 and was started on IV pressors. Since then, Scr. increased to 1.62 and 2.04 on repeat labs today. No recent NSAID or contrast use noted in chart. Patient is currently intubated and sedated; unable to provide further history.       PAST HISTORY  --------------------------------------------------------------------------------  PAST MEDICAL & SURGICAL HISTORY:  GERD (gastroesophageal reflux disease)  COPD with asthma  Colon polyp  Spinal stenosis  Hyperlipidemia  Hypertension  History of bilateral hip replacements  History of laminectomy: 2013    FAMILY HISTORY: no family history of kidney disease    PAST SOCIAL HISTORY: no history of smoking as per family     ALLERGIES & MEDICATIONS  --------------------------------------------------------------------------------  Allergies    Carrots (Pruritus)  dust (Pruritus)  No Known Drug Allergies  Nuts (Pruritus)  shellfish (Pruritus)    Intolerances      Standing Inpatient Medications  ALBUTerol    90 MICROgram(s) HFA Inhaler 2 Puff(s) Inhalation every 4 hours  artificial  tears Solution 1 Drop(s) Both EYES daily  aspirin  chewable 81 milliGRAM(s) Oral daily  atorvastatin 20 milliGRAM(s) Oral at bedtime  azithromycin  IVPB 500 milliGRAM(s) IV Intermittent every 24 hours  chlorhexidine 0.12% Liquid 15 milliLiter(s) Swish and Spit two times a day  chlorhexidine 4% Liquid 1 Application(s) Topical <User Schedule>  cisatracurium Infusion 3 MICROgram(s)/kG/Min IV Continuous <Continuous>  dextrose 5%. 1000 milliLiter(s) IV Continuous <Continuous>  dextrose 50% Injectable 12.5 Gram(s) IV Push once  dextrose 50% Injectable 25 Gram(s) IV Push once  dextrose 50% Injectable 25 Gram(s) IV Push once  fentaNYL   Infusion. 0.5 MICROgram(s)/kG/Hr IV Continuous <Continuous>  heparin  Injectable 5000 Unit(s) SubCutaneous every 8 hours  insulin glargine Injectable (LANTUS) 10 Unit(s) SubCutaneous every morning  insulin lispro (HumaLOG) corrective regimen sliding scale   SubCutaneous every 6 hours  ipratropium 17 MICROgram(s) HFA Inhaler 1 Puff(s) Inhalation every 6 hours  methylPREDNISolone sodium succinate Injectable 40 milliGRAM(s) IV Push every 12 hours  norepinephrine Infusion 0.05 MICROgram(s)/kG/Min IV Continuous <Continuous>  phenylephrine    Infusion 0.1 MICROgram(s)/kG/Min IV Continuous <Continuous>  propofol Infusion 50 MICROgram(s)/kG/Min IV Continuous <Continuous>  sodium chloride 3%  Inhalation 3 milliLiter(s) Inhalation every 6 hours    PRN Inpatient Medications  dextrose 40% Gel 15 Gram(s) Oral once PRN  glucagon  Injectable 1 milliGRAM(s) IntraMuscular once PRN      REVIEW OF SYSTEMS  --------------------------------------------------------------------------------    Unable to obtain.     VITALS/PHYSICAL EXAM  --------------------------------------------------------------------------------  T(C): 38.1 (09-03-18 @ 12:00), Max: 38.1 (09-03-18 @ 12:00)  HR: 138 (09-03-18 @ 13:00) (109 - 138)  BP: 144/28 (09-03-18 @ 11:00) (88/46 - 170/89)  RR: 14 (09-03-18 @ 13:00) (14 - 44)  SpO2: 95% (09-03-18 @ 13:00) (91% - 100%)  Wt(kg): --        09-02-18 @ 07:01  -  09-03-18 @ 07:00  --------------------------------------------------------  IN: 4633.3 mL / OUT: 745 mL / NET: 3888.3 mL    09-03-18 @ 07:01  -  09-03-18 @ 13:17  --------------------------------------------------------  IN: 2178.5 mL / OUT: 0 mL / NET: 2178.5 mL      Physical Exam:  	Gen: Elderly female, intubated on mechanical ventilator  	HEENT: anicteric, no JVD present.   	Pulm: CTA B/L  	CV:  S1S2  	Abd: +BS, soft   	Ext:  B/L Lower ext edema +  	Neuro: Sedated.   	Skin: Warm, without rashes    LABS/STUDIES  --------------------------------------------------------------------------------              8.8    5.85  >-----------<  81       [09-03-18 @ 03:10]              28.7     145  |  105  |  49  ----------------------------<  150      [09-03-18 @ 10:43]  4.0   |  22  |  2.04        Ca     6.9     [09-03-18 @ 10:43]      Mg     1.9     [09-03-18 @ 10:43]      Phos  4.4     [09-03-18 @ 10:43]    TPro  4.8  /  Alb  2.0  /  TBili  0.7  /  DBili  x   /  AST  45  /  ALT  40  /  AlkPhos  122  [09-03-18 @ 03:10]    PT/INR: PT 13.6 , INR 1.22       [09-03-18 @ 10:42]  PTT: 41.4       [09-03-18 @ 10:42]      Creatinine Trend:  SCr 2.04 [09-03 @ 10:43]  SCr 1.62 [09-03 @ 03:10]  SCr 0.83 [09-02 @ 02:32]  SCr 0.74 [09-01 @ 03:10]  SCr 0.81 [08-31 @ 08:00]    Urinalysis - [09-03-18 @ 08:30]      Color YELLOW / Appearance HAZY / SG 1.030 / pH 5.0      Gluc NEGATIVE / Ketone 160  / Bili SMALL / Urobili NORMAL       Blood NEGATIVE / Protein 100 / Leuk Est NEGATIVE / Nitrite NEGATIVE      RBC 0-2 / WBC 0-2 / Hyaline  / Gran  / Sq Epi  / Non Sq Epi FEW / Bacteria MODERATE    Urine Creatinine 160.20      [09-03-18 @ 09:30]  Urine Sodium 24      [09-03-18 @ 09:30]  Urine Urea Nitrogen 140.6      [09-03-18 @ 09:30]  Urine Potassium 38.1      [09-03-18 @ 09:30]  Urine Chloride < 20      [09-03-18 @ 09:30]  Urine Osmolality 321      [09-03-18 @ 09:30]    HbA1c 6.7      [09-01-18 @ 03:10]

## 2018-09-03 NOTE — DISCHARGE NOTE FOR THE EXPIRED PATIENT - HOSPITAL COURSE
78F with COPD, asthma, GERD, HLD, HTN, spinal stenosis w/ hardware, colon CA (dx. 02/2018 s/p colon resection in 03/2018) presented on 8/31/2018 for worsening shortness of breath and tachypnea found to be positive for hMPV with severe COPD exacerbation requiring emergent intubation. Pt was paralyzed with nimbex to optimize ventilation. Pt became progressively more acidotic and hypercapnic, and was started on bicarbonate drip with little improvement.    On 9/3, pt was found without a pulse at 7:15pm. Compressions were started. She was given 3 doses of epi, 2 doses of bicarb, 1 dose of calcium chloride, 1 shock for vfib, and 1 dose of magnesium. ROSC at 7:24pm. At 7:32pm, pt was found to be in asystole. Compressions were started. She was given 5 doses of epi and 2 doses of bicarb. Pt without a pulse, absent breath sounds, and fixed pupils. Time of death called at 7:47pm.

## 2018-09-03 NOTE — PROCEDURE NOTE - NSPOSTCAREGUIDE_GEN_A_CORE
Care for catheter as per unit/ICU protocols
Keep the cast/splint/dressing clean and dry
Care for catheter as per unit/ICU protocols

## 2018-09-03 NOTE — CONSULT NOTE ADULT - ATTENDING COMMENTS
patient with obvious obstructive airway disease, unclear asthma/copd, with worsening SOB despite nebulizers/now on NIV, she is feeling loads better after meds/NIV, speaking in full sentences/i do not believe she needs ICU level of care at this time/please re-consult if needed
Patient with history of COPD, hypertension, admitted with shortness of breath ultimately requiring intubation.  Course thereafter noted for hypotension, initiation of pressors and noting of worsening creatinine.  Likely hemodynamically mediated HELENE.  As noted above, fluid boluses could be considered in order to optimize filling pressures with hopes of reducing pressors and ultimately increasing renal perfusion.  Further deterioration may necessitate dialysis.  This was reviewed with family along with ICU team.  Low calcium corrects to 8.5 for low serum albumin. Will monitor with you.  Continue with serial labs.

## 2018-09-03 NOTE — PROCEDURE NOTE - NSPROCNAME_GEN_A_CORE
Arterial Puncture/Cannulation
Arterial Puncture/Cannulation
Central Line Insertion
Gastric Intubation/Gastric Lavage
Tracheal Intubation
Central Line Insertion
Arterial Puncture/Cannulation

## 2018-09-03 NOTE — PROCEDURE NOTE - NSINDICATIONS_GEN_A_CORE
critical patient/arterial puncture to obtain ABG's/monitoring purposes
blood sampling/arterial puncture to obtain ABG's/monitoring purposes/critical patient
dialysis/CRRT
feeds
critical patient/arterial puncture to obtain ABG's/monitoring purposes
critical patient/respiratory failure
critical illness/emergency venous access/venous access

## 2018-09-03 NOTE — CONSULT NOTE ADULT - PROBLEM SELECTOR RECOMMENDATION 9
Patient with HELENE in setting of hypotension. On review of previous labs, Scr. was noted to be WNL. On admission, . On review of previous labs, Scr. was noted to be 0.65 on 3/25/18. On recent admission, patient was noted to be 0.63 on 8/30 and 0.83 on 9/2. However Scr. noted to be increased to 1.62 and 2.04 on labs done today. Patient is currently oliguric and on IV pressors. UA shows no proteinuria or hematuria. FeNA calculated to be 0.2 which is suggestive of pre renal etiology. Patient with hemodynamically mediated HELENE in setting of low BP. Continue with gentle IV hydration with IVF boluses in view of systolic dysfunction. Check renal ultrasound when patient is more stable. No acute indication for HD at this time. However explained to family about need for possibly starting RRT if renal functions worsens. Monitor Scr, I/O, and electrolytes. Avoid NSAIDs, RCAs, ACE-I, and ARBs for now. Patient with HELENE in setting of hypotension. On review of previous labs, Scr. was noted to be WNL. On review of previous labs, Scr. was noted to be 0.65 on 3/25/18. On recent admission, patient was noted to be 0.63 on 8/30 and 0.83 on 9/2. However Scr. noted to be increased to 1.62 and 2.04 on labs done today. Patient is currently oliguric and on IV pressors. UA shows no proteinuria or hematuria. FeNA calculated to be 0.2 which is suggestive of pre renal etiology. Patient with hemodynamically mediated HELENE in setting of low BP. Continue with gentle IV hydration with IVF boluses in view of systolic dysfunction. Check renal ultrasound when patient is more stable. No acute indication for HD at this time. However explained to family about need for possibly starting RRT if renal functions worsens. Monitor Scr, I/O, and electrolytes. Avoid NSAIDs, RCAs, ACE-I, and ARBs for now.

## 2018-09-03 NOTE — PROGRESS NOTE ADULT - SUBJECTIVE AND OBJECTIVE BOX
Ricco Mitchell, PGY1  MICU Team  [phone]    SUBJECTIVE: Patient seen and examined at bedside.     Interval Events: Large volume diarrhea overnight, started on levophed for low BP, given bicarb and K overnight    OBJECTIVE:    VITAL SIGNS:  ICU Vital Signs Last 24 Hrs  T(C): 37.9 (03 Sep 2018 08:00), Max: 37.9 (03 Sep 2018 08:00)  T(F): 100.3 (03 Sep 2018 08:00), Max: 100.3 (03 Sep 2018 08:00)  HR: 132 (03 Sep 2018 12:24) (109 - 136)  BP: 144/28 (03 Sep 2018 11:00) (88/46 - 170/89)  BP(mean): 55 (03 Sep 2018 11:00) (53 - 111)  ABP: 92/47 (03 Sep 2018 12:24) (59/34 - 120/58)  ABP(mean): 62 (03 Sep 2018 12:24) (41 - 78)  RR: 14 (03 Sep 2018 12:24) (14 - 44)  SpO2: 94% (03 Sep 2018 12:24) (91% - 100%)    Mode: AC/ CMV (Assist Control/ Continuous Mandatory Ventilation), RR (machine): 14, TV (machine): 400, FiO2: 40, PEEP: 5, MAP: 13, PIP: 55     @ : @ 07:00  --------------------------------------------------------  IN: 4633.3 mL / OUT: 745 mL / NET: 3888.3 mL     @ 07: @ 12:49  --------------------------------------------------------  IN: 1756.5 mL / OUT: 0 mL / NET: 1756.5 mL      CAPILLARY BLOOD GLUCOSE      POCT Blood Glucose.: 126 mg/dL (03 Sep 2018 11:38)      PHYSICAL EXAM:  	GENERAL: intubated, sedated.   	HEENT: pupils equal & reactive, no scleral icterus  	HEART: S1, S2. No murmurs noted.     LUNGS: Diffuse rhonchi heard in bilateral lung fields with occasional wheezes   	ABDOMEN: soft, nondistended.   	NEURO: sedated, paralyzed   EXT: No LE edema    MEDICATIONS:  MEDICATIONS  (STANDING):  ALBUTerol    90 MICROgram(s) HFA Inhaler 2 Puff(s) Inhalation every 4 hours  artificial  tears Solution 1 Drop(s) Both EYES daily  aspirin  chewable 81 milliGRAM(s) Oral daily  atorvastatin 20 milliGRAM(s) Oral at bedtime  azithromycin  IVPB 500 milliGRAM(s) IV Intermittent every 24 hours  chlorhexidine 0.12% Liquid 15 milliLiter(s) Swish and Spit two times a day  chlorhexidine 4% Liquid 1 Application(s) Topical <User Schedule>  cisatracurium Infusion 3 MICROgram(s)/kG/Min (13.392 mL/Hr) IV Continuous <Continuous>  dextrose 5%. 1000 milliLiter(s) (50 mL/Hr) IV Continuous <Continuous>  dextrose 50% Injectable 12.5 Gram(s) IV Push once  dextrose 50% Injectable 25 Gram(s) IV Push once  dextrose 50% Injectable 25 Gram(s) IV Push once  fentaNYL   Infusion. 0.5 MICROgram(s)/kG/Hr (3.72 mL/Hr) IV Continuous <Continuous>  heparin  Injectable 5000 Unit(s) SubCutaneous every 8 hours  insulin glargine Injectable (LANTUS) 10 Unit(s) SubCutaneous every morning  insulin lispro (HumaLOG) corrective regimen sliding scale   SubCutaneous every 6 hours  ipratropium 17 MICROgram(s) HFA Inhaler 1 Puff(s) Inhalation every 6 hours  methylPREDNISolone sodium succinate Injectable 40 milliGRAM(s) IV Push every 12 hours  norepinephrine Infusion 0.05 MICROgram(s)/kG/Min (3.488 mL/Hr) IV Continuous <Continuous>  phenylephrine    Infusion 0.1 MICROgram(s)/kG/Min (1.395 mL/Hr) IV Continuous <Continuous>  propofol Infusion 50 MICROgram(s)/kG/Min (21 mL/Hr) IV Continuous <Continuous>  sodium chloride 3%  Inhalation 3 milliLiter(s) Inhalation every 6 hours    MEDICATIONS  (PRN):  dextrose 40% Gel 15 Gram(s) Oral once PRN Blood Glucose LESS THAN 70 milliGRAM(s)/deciliter  glucagon  Injectable 1 milliGRAM(s) IntraMuscular once PRN Glucose LESS THAN 70 milligrams/deciliter      ALLERGIES:  Allergies    Carrots (Pruritus)  dust (Pruritus)  No Known Drug Allergies  Nuts (Pruritus)  shellfish (Pruritus)    Intolerances        LABS:                        8.8    5.85  )-----------( 81       ( 03 Sep 2018 03:10 )             28.7     CBC Full  -  ( 03 Sep 2018 03:10 )  WBC Count : 5.85 K/uL  Hemoglobin : 8.8 g/dL  Hematocrit : 28.7 %  Platelet Count - Automated : 81 K/uL  Mean Cell Volume : 107.9 fL  Mean Cell Hemoglobin : 33.1 pg  Mean Cell Hemoglobin Concentration : 30.7 %  Auto Neutrophil # : 4.64 K/uL  Auto Lymphocyte # : 0.98 K/uL  Auto Monocyte # : 0.19 K/uL  Auto Eosinophil # : 0.00 K/uL  Auto Basophil # : 0.02 K/uL  Auto Neutrophil % : 79.4 %  Auto Lymphocyte % : 16.8 %  Auto Monocyte % : 3.2 %  Auto Eosinophil % : 0.0 %  Auto Basophil % : 0.3 %        145  |  105  |  49<H>  ----------------------------<  150<H>  4.0   |  22  |  2.04<H>    Ca    6.9<L>      03 Sep 2018 10:43  Phos  4.4       Mg     1.9         TPro  4.8<L>  /  Alb  2.0<L>  /  TBili  0.7  /  DBili  x   /  AST  45<H>  /  ALT  40<H>  /  AlkPhos  122<H>      Creatinine Trend: 2.04<--, 1.62<--, 0.83<--, 0.74<--, 0.81<--, 0.63<--  LIVER FUNCTIONS - ( 03 Sep 2018 03:10 )  Alb: 2.0 g/dL / Pro: 4.8 g/dL / ALK PHOS: 122 u/L / ALT: 40 u/L / AST: 45 u/L / GGT: x           PT/INR - ( 03 Sep 2018 10:42 )   PT: 13.6 SEC;   INR: 1.22          PTT - ( 03 Sep 2018 10:42 )  PTT:41.4 SEC      ABG - ( 03 Sep 2018 08:46 )  pH, Arterial: 7.26  pH, Blood: x     /  pCO2: 59    /  pO2: 85    / HCO3: 23    / Base Excess: -0.4  /  SaO2: 95.4              Urinalysis Basic - ( 03 Sep 2018 08:30 )    Color: YELLOW / Appearance: HAZY / S.030 / pH: 5.0  Gluc: NEGATIVE / Ketone: 160  / Bili: SMALL / Urobili: NORMAL   Blood: NEGATIVE / Protein: 100 / Nitrite: NEGATIVE   Leuk Esterase: NEGATIVE / RBC: 0-2 / WBC 0-2   Sq Epi: x / Non Sq Epi: FEW / Bacteria: MODERATE        MICROBIOLOGY:    IMAGING:    RADIOLOGY & ADDITIONAL TESTS: Reviewed.

## 2018-09-03 NOTE — PROGRESS NOTE ADULT - ASSESSMENT
78F with COPD, asthma, GERD, HLD, HTN, spinal stenosis w/ hardware, colon CA (dx. 02/2018 s/p colon resection in 03/2018) presents with SOB and severe COPD exacerbation requiring intubation in the setting of positive hMPV. Continues to remain hypotensive on two pressors and not improving clinically.     # Neuro:   - C/W propofol for sedation with daily sedation vacation  - C/W Nimbex gtt, will repeat ABG and if patient's PCO2 and PO2 is improved will consider discontinuing nimbex  - C/W fentanyl gtt      # CV: Severe LV systolic dysfunction on POCUS   - Cardiac function improved on POCUS   - Troponins trended down, no need to trend further.   - will c/w ASA / Lipitor   - on Levophed and phenylephrine, titrate to MAP >65  -Will call cardiology once patient is stabilized    # Pulm: Hypercapneic respiratory failure with COPD exacerbation, emergently intubated and paralyzed to optimize ventilation   - likely secondary to hMPV respiratory infection  - continues to have diffuse wheezes/rhonchi on exam. Continue Albuterol/Atrovent inhaler Q4.   - Decreased lung sliding noted on bedside echo. CXR negative for pneumnothorax.   - C/W Solu-Medrol 40 MG bid IV   -C/W levoquin 3 day course (day 2/3) for COPD exacerbation   -Metaneb w/ NACL q6  -Legionella urine Ag, if negative will stop azithromycin as patient is having significant amounts of stool which could explain low bicarb    # ID: Sepsis secondary to hMPV respiratory infection  - Doubt PNA  -monitor off antibiotics given CXR with clear lungs   -    # Onc: Colon cancer s/p resection in 03/2018 on chemotherapy  - on oxaliplatin therapy (last given 8/20) and Xeloda (last given 8/25)  - outpatient Oncologist Dr. Juancarlos Yepez ([phone])    # Renal:  -Serum Cr up to 2.04 up from .83 yesterday, Acute Kidney Injury likely due to hypotension resulting in ATN. Urine O/P poor (345ml over past 24 hrs, no o/p s/p 1L LR bolus)  -Renal saw patient, recommend no further management at this time    #Endocrine  -Will switch patient to basal insulin 10 units in addition to SS   -A1C, unclear baseline DM status (patient's family denies H/O DM)    # DVT ppx: Lovenox 40 subQ

## 2018-09-03 NOTE — PROCEDURE NOTE - PRACTITIONER PERFORMING THE TIME OUT
Demi Urena MD
Tamanna Kimball PA-C

## 2018-09-03 NOTE — PROCEDURE NOTE - NSPOSTPRCRAD_GEN_A_CORE
no pneumothorax/post-procedure radiography performed/central line located in the superior vena cava
post-procedure radiography performed/pending review
post procedure radiography not performed/inferior vena cava

## 2018-09-04 LAB
BACTERIA BLD CULT: SIGNIFICANT CHANGE UP
BACTERIA BLD CULT: SIGNIFICANT CHANGE UP

## 2018-09-05 LAB
GRAM STN SPT: SIGNIFICANT CHANGE UP
METHOD TYPE: SIGNIFICANT CHANGE UP
ORGANISM # SPEC MICROSCOPIC CNT: SIGNIFICANT CHANGE UP

## 2018-09-06 LAB
-  AMIKACIN: SIGNIFICANT CHANGE UP
-  AMIKACIN: SIGNIFICANT CHANGE UP
-  AMPICILLIN/SULBACTAM: SIGNIFICANT CHANGE UP
-  AMPICILLIN: SIGNIFICANT CHANGE UP
-  AZTREONAM: SIGNIFICANT CHANGE UP
-  AZTREONAM: SIGNIFICANT CHANGE UP
-  CEFAZOLIN: SIGNIFICANT CHANGE UP
-  CEFEPIME: SIGNIFICANT CHANGE UP
-  CEFEPIME: SIGNIFICANT CHANGE UP
-  CEFOXITIN: SIGNIFICANT CHANGE UP
-  CEFTAZIDIME: SIGNIFICANT CHANGE UP
-  CEFTAZIDIME: SIGNIFICANT CHANGE UP
-  CEFTRIAXONE: SIGNIFICANT CHANGE UP
-  CIPROFLOXACIN: SIGNIFICANT CHANGE UP
-  CIPROFLOXACIN: SIGNIFICANT CHANGE UP
-  ERTAPENEM: SIGNIFICANT CHANGE UP
-  GENTAMICIN: SIGNIFICANT CHANGE UP
-  GENTAMICIN: SIGNIFICANT CHANGE UP
-  IMIPENEM: SIGNIFICANT CHANGE UP
-  IMIPENEM: SIGNIFICANT CHANGE UP
-  LEVOFLOXACIN: SIGNIFICANT CHANGE UP
-  LEVOFLOXACIN: SIGNIFICANT CHANGE UP
-  MEROPENEM: SIGNIFICANT CHANGE UP
-  MEROPENEM: SIGNIFICANT CHANGE UP
-  PIPERACILLIN/TAZOBACTAM: SIGNIFICANT CHANGE UP
-  PIPERACILLIN/TAZOBACTAM: SIGNIFICANT CHANGE UP
-  TIGECYCLINE: SIGNIFICANT CHANGE UP
-  TOBRAMYCIN: SIGNIFICANT CHANGE UP
-  TOBRAMYCIN: SIGNIFICANT CHANGE UP
-  TRIMETHOPRIM/SULFAMETHOXAZOLE: SIGNIFICANT CHANGE UP
BACTERIA SPT RESP CULT: SIGNIFICANT CHANGE UP
METHOD TYPE: SIGNIFICANT CHANGE UP

## 2019-02-20 ENCOUNTER — APPOINTMENT (OUTPATIENT)
Dept: PULMONOLOGY | Facility: CLINIC | Age: 79
End: 2019-02-20

## 2019-04-09 NOTE — H&P ADULT - NSHPPOADEEPVENOUSTHROMB_GEN_A_CORE
Quality 431: Preventive Care And Screening: Unhealthy Alcohol Use - Screening: Patient screened for unhealthy alcohol use using a single question and scores less than 2 times per year Quality 110: Preventive Care And Screening: Influenza Immunization: Influenza Immunization not Administered for Documented Reasons. Detail Level: Detailed Quality 111:Pneumonia Vaccination Status For Older Adults: Pneumococcal Vaccination not Administered or Previously Received, Reason not Otherwise Specified no Quality 154 Part B: Falls: Risk Screening (Should Be Reported With Measure 155.): Patient screened for future fall risk; documentation of no falls in the past year or only one fall without injury in the past year Quality 131: Pain Assessment And Follow-Up: Pain assessment using a standardized tool is documented as negative, no follow-up plan required Quality 226: Preventive Care And Screening: Tobacco Use: Screening And Cessation Intervention: Patient screened for tobacco use and is an ex/non-smoker Quality 402: Tobacco Use And Help With Quitting Among Adolescents: Patient screened for tobacco and never smoked Quality 154 Part A: Falls: Risk Assessment (Should Be Reported With Measure 155.): Falls risk assessment completed and documented in the past 12 months. Quality 47: Advance Care Plan: Advance care planning not documented, reason not otherwise specified. Quality 155 (Denominator): Falls Plan Of Care: Plan of Care not Documented, Reason not Otherwise Specified

## 2020-12-23 NOTE — PROCEDURE NOTE - NSPATIENTPOSTION_GEN_A_CORE
[address] [place] [address], [address] [place] [address]                                OPERATIVE REPORT    PATIENT NAME: Sera Perales        :        1993  MED REC NO:   [id number]                          ROOM:  ACCOUNT NO:   [de-identified]                           ADMIT DATE: 2020  PROVIDER:     Xiomara Garcia MD    CONSULT DATE:  2020    PREOPERATIVE DIAGNOSIS:  Left ankle trimalleolar fracture dislocation,  status post external fixator. POSTOPERATIVE DIAGNOSIS:  Left ankle pilon fracture and lateral  malleolus fracture. SURGICAL PROCEDURES:  Removal of uniplanar external fixator and open  reduction and internal fixation left ankle pilon and lateral malleolus  fractures using Synthes distal tibia and fibular locking plates. ATTENDING PHYSICIAN:  Xiomara Garcia MD    ANESTHESIA:  Block plus general.    ESTIMATED BLOOD LOSS:  Less than 100 mL. DESCRIPTION OF PROCEDURE:  The patient was taken to the operating room  after satisfactory anesthesia was induced. She was continued in the  supine position. A time-out was performed. Her external fixator pins  were disconnected from the bars and the clamps and a t handle chuck_ was used to  remove the two half pins in the tibia. The calcaneus pin was cut off on  the medial side and removed from the lateral side. Following this, she  was placed then in the right lateral decubitus position. Her left lower  extremity was prepped and draped in a sterile manner. A second time-out  was performed. The patient had been given 2 gm of Ancef. After the   tourniquet was inflated, simultaneous medial and lateral incisions were  made. A posterior lateral approach was made to her fibula and distal  tibia, and a medial approach was made to her medial tibial fracture.    The patient had a comminuted Carter B fibula fracture with the anterior
fibular comminution. The fibula was split anterior posteriorly and then  there was a separate anterior butterfly fragment that was attached to  The anterior tib-fib ligaments. The sural nerve posteriorly and saphenous nerve and vein were identified and protected. The dissection was carried along the fibula and then dissection along  the posterior tibia from lateral to medial.  The patient already had  early callus formation along the fracture site. There was fibrous  tissue along the posterior malleolus fracture that was debrided with a  rongeur. A curette was used to curette the posterior malleolus fracture hematoma  from superiorly on the posterior lateral incision and then she was in  the floppy lateral position so that we could rotate her hip externally  to see the medial side. The medial malleolus fracture was vertically  split anterior to posteriorly and the posterior part of the medial  malleolus fracture distal to the joint surface was comminuted much and  was debrided, and it was attached to the posterior tibial tendon sheath,  but there was no portion of this fragment that was amendable to any  fixation. There was only a small anterior one-half of the medial  malleolus where the medial malleolus reduces to the tibia anteriorly  where the reduction could be confirmed on the joint surface anteriorly. The joint was able to be inspected through the medial wound and the  medial malleolus fracture. There was a large metaphyseal piece on the  medial side that was fractured vertically anterior posteriorly as well  that was significantly displaced 5-6 mm medially and posteriorly. First  chunk of bone was about 2.5 inches x 1.5 inches in a rectangular piece  of bone. The articular surface of this piece corresponded to the  superior medial aspect of the distal tibial plafond.   The posterior  malleolus fracture was a large fragment and again it could be seen and
manipulated through the medial malleolus fracture so that irrigation was  performed through the medial fracture to try to clear the hematoma and  early callus from this area. Her ankle was very unstable due to the comminuted fractures. While visualizing the posterior medial  side through the medial wound, the posterior lateral and posterior  malleolus fracture could be seen and a ball-tip pusher was inserted  through the posterior lateral wound while medially a dental pick to  manipulate the posterior lateral fragment of the distal tibial pilon. This was then reduced to the anterior lateral distal tibial pilon and  two 2-0 K-wires were used to pin this fragment in place and then the  posterior medial fragment was reduced to the anterior medial fragment  and pinned in place with K-wires as well and the cannulated screw  K-wires were used to pin the medial malleolus after reducing it through this fragment. Intraoperative provisional fluoroscopic views were taken and the joint  was reduced. The joint was able again to be visualized through the  incision on the anterior medial side and a freer also could be used to  palpate the joint surface and it was also able to be seen through the  anterior medial wound. The fibula reduction was judged based on the  short oblique posterior spike and therefore an anti-glide type of plate  was placed to capture the distal fragment at the apex of the obliquity  of this fracture line. The anterior half of the fibula was reduced with  anterior-posterior tenaculums and then the distal fibular locking plate  was secured distally and proximally. Since the distal fibular plate was  placed in an anti-glide fashion, the anterior holes of the locking plate  was used and then at the center hole a cortical screw was placed to  Capture and lag the anterior split in the fibula through a non-locking hole in  the distal part of the distal fibular plate.   There is not enough bone
supine
Trendelenburg

## 2021-06-15 NOTE — ED PROVIDER NOTE - NS_EDPROVIDERDISPOUSERTYPE_ED_A_ED
Scribe Attestation (For Scribes USE Only)... Scribe Attestation (For Scribes USE Only).../Attending Attestation (For Attendings USE Only)...

## 2021-08-03 NOTE — PATIENT PROFILE ADULT. - ACCEPTABLE
5 Metronidazole Counseling:  I discussed with the patient the risks of metronidazole including but not limited to seizures, nausea/vomiting, a metallic taste in the mouth, nausea/vomiting and severe allergy.

## 2022-10-26 NOTE — BRIEF OPERATIVE NOTE - TYPE OF ANESTHESIA
General Suturegard Intro: Intraoperative tissue expansion was performed, utilizing the SUTUREGARD device, in order to reduce wound tension.

## 2022-11-15 NOTE — PROCEDURE NOTE - I WAS PRESENT DURING THE KEY PORTIONS OF THE PROCEDURE AND IMMEDIATELY AVAILABLE DURING THE ENTIRE PROCEDURE
Medical Necessity Information: It is in your best interest to select a reason for this procedure from the list below. All of these items fulfill various CMS LCD requirements except the new and changing color options. Include Z78.9 (Other Specified Conditions Influencing Health Status) As An Associated Diagnosis?: No Medical Necessity Clause: This procedure was medically necessary because the lesion that was treated was: spteading Detail Level: Simple Size Of Lesion In Cm: 0.4 X Size Of Lesion In Cm (Optional): 0 Anesthesia Type: 0.5% lidocaine without epinephrine Hemostasis: Aluminum Chloride Statement Selected Wound Care: Vaseline Consent was obtained from the patient. The risks and benefits to therapy were discussed in detail. Specifically, the risks of infection, scarring, bleeding, prolonged wound healing, incomplete removal, allergy to anesthesia, nerve injury and recurrence were addressed. Prior to the procedure, the treatment site was clearly identified and confirmed by the patient. All components of Universal Protocol/PAUSE Rule completed. Post-Care Instructions: I reviewed with the patient in detail post-care instructions. Patient is to keep the biopsy site dry overnight, and then apply bacitracin twice daily until healed. Patient may apply hydrogen peroxide soaks to remove any crusting.

## 2025-04-23 NOTE — H&P ADULT - NSHPLABSRESULTS_GEN_ALL_CORE
Patient expressed no known problems or needs
Laboratory studies, imaging personally reviewed.    ABG - ( 31 Aug 2018 06:25 )  pH, Arterial: 7.21  pH, Blood: x     /  pCO2: 51    /  pO2: 151   / HCO3: 18    / Base Excess: -6.8  /  SaO2: 98.2                            11.0   13.55 )-----------( 120      ( 30 Aug 2018 22:40 )             33.4     08-30    132<L>  |  95<L>  |  15  ----------------------------<  263<H>  4.4   |  19<L>  |  0.63    Ca    8.7      30 Aug 2018 22:40    TPro  6.7  /  Alb  3.4  /  TBili  1.1  /  DBili  x   /  AST  86<H>  /  ALT  44<H>  /  AlkPhos  126<H>  08-30        ******PRELIMINARY REPORT******    ******PRELIMINARY REPORT******            EXAM:  XR CHEST PORTABLE ROUTINE 1V        PROCEDURE DATE:  Aug 30 2018     ******PRELIMINARY REPORT******    ******PRELIMINARY REPORT******            INTERPRETATION:  No urgent findings      ******PRELIMINARY REPORT******    ******PRELIMINARY REPORT******          LAYLA MONTANO M.D., RADIOLOGY RESIDENT